# Patient Record
Sex: FEMALE | Race: OTHER | Employment: UNEMPLOYED | URBAN - METROPOLITAN AREA
[De-identification: names, ages, dates, MRNs, and addresses within clinical notes are randomized per-mention and may not be internally consistent; named-entity substitution may affect disease eponyms.]

---

## 2017-03-04 ENCOUNTER — OFFICE VISIT (OUTPATIENT)
Dept: INTERNAL MEDICINE CLINIC | Facility: CLINIC | Age: 27
End: 2017-03-04

## 2017-03-04 VITALS
WEIGHT: 264.19 LBS | SYSTOLIC BLOOD PRESSURE: 111 MMHG | BODY MASS INDEX: 41 KG/M2 | HEART RATE: 84 BPM | DIASTOLIC BLOOD PRESSURE: 76 MMHG | TEMPERATURE: 98 F | RESPIRATION RATE: 16 BRPM

## 2017-03-04 DIAGNOSIS — M54.50 ACUTE BILATERAL LOW BACK PAIN WITHOUT SCIATICA: ICD-10-CM
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
## 2017-03-04 DIAGNOSIS — M54.2 NECK PAIN, MUSCULOSKELETAL: ICD-10-CM

## 2017-03-04 DIAGNOSIS — M25.511 ACUTE PAIN OF RIGHT SHOULDER: ICD-10-CM

## 2017-03-04 PROCEDURE — 99213 OFFICE O/P EST LOW 20 MIN: CPT | Performed by: INTERNAL MEDICINE

## 2017-03-04 PROCEDURE — 99212 OFFICE O/P EST SF 10 MIN: CPT | Performed by: INTERNAL MEDICINE

## 2017-03-04 RX ORDER — CYCLOBENZAPRINE HCL 5 MG
5 TABLET ORAL 2 TIMES DAILY PRN
Qty: 30 TABLET | Refills: 0 | Status: SHIPPED | OUTPATIENT
Start: 2017-03-04
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
## 2020-12-01 ENCOUNTER — LAB REQUISITION (OUTPATIENT)
Dept: LAB | Facility: HOSPITAL | Age: 30
End: 2020-12-01
Payer: COMMERCIAL

## 2020-12-01 DIAGNOSIS — Z01.419 ENCOUNTER FOR GYNECOLOGICAL EXAMINATION (GENERAL) (ROUTINE) WITHOUT ABNORMAL FINDINGS: ICD-10-CM

## 2020-12-01 PROCEDURE — 88175 CYTOPATH C/V AUTO FLUID REDO: CPT | Performed by: OBSTETRICS & GYNECOLOGY

## 2021-07-10 PROCEDURE — 93010 ELECTROCARDIOGRAM REPORT: CPT | Performed by: EMERGENCY MEDICINE

## 2021-07-10 PROCEDURE — 93005 ELECTROCARDIOGRAM TRACING: CPT

## 2021-07-10 PROCEDURE — 99283 EMERGENCY DEPT VISIT LOW MDM: CPT

## 2021-07-11 ENCOUNTER — HOSPITAL ENCOUNTER (EMERGENCY)
Facility: HOSPITAL | Age: 31
Discharge: HOME | End: 2021-07-11
Attending: EMERGENCY MEDICINE
Payer: COMMERCIAL

## 2021-07-11 VITALS
RESPIRATION RATE: 18 BRPM | WEIGHT: 270 LBS | DIASTOLIC BLOOD PRESSURE: 91 MMHG | SYSTOLIC BLOOD PRESSURE: 134 MMHG | TEMPERATURE: 99 F | HEART RATE: 84 BPM | HEIGHT: 67 IN | BODY MASS INDEX: 42.38 KG/M2 | OXYGEN SATURATION: 97 %

## 2021-07-11 DIAGNOSIS — N64.4 BREAST PAIN: Primary | ICD-10-CM

## 2021-07-11 NOTE — ED PROVIDER NOTES
Patient Seen in: [facility] AND [facility] Emergency Department      History   Patient presents with:  Chest Pain Angina  Breast Problem    Stated Complaint: chest pain for a month    HPI/Subjective:   HPI    Patient is a 60-year-old female who presents with lef Physical Exam    GENERAL: No acute distress, awake and alert  HEENT: MMM, EOMI, PERRL  Neck: supple, non tender  CV: RRR, no murmurs, no chest wall tenderness  Resp: CTAB, no wheezes or retractions  Ab: soft, nontender, no distension  Extremities:

## 2021-07-11 NOTE — ED INITIAL ASSESSMENT (HPI)
Patient presents to ED with left sided chest pain x 1 month. Patient states that she has been to 2 doctors that have told her to take ibuprofen. Patient states that her left breast feels heavy and has had that sensation since yesterday.  Patient also state

## 2023-07-27 LAB
AMB EXT HPV: NEGATIVE
AMB EXT THINPREP PAP: NEGATIVE

## 2024-01-04 LAB — AMB EXT GONOCOCCUS, NUCLEIC ACID AMP: NOT DETECTED

## 2024-01-17 ENCOUNTER — TELEPHONE (OUTPATIENT)
Dept: OBGYN CLINIC | Facility: CLINIC | Age: 34
End: 2024-01-17

## 2024-01-17 NOTE — TELEPHONE ENCOUNTER
New Pt called to schedule misses menses appt. LMP 10/29. Had ultrasound completed at [facility] but insurance changed.

## 2024-01-18 NOTE — TELEPHONE ENCOUNTER
Pt currently 12w3d based on US from 12/14/23 noting LIUP w/+FHT's. US notes EDC of 7/29/2024. (See Care Everywhere) Pt looking to DAVID d/t insurance issues. Pt informed of both male and female providers and that we require she rotate appts with both since it will be provider on-call that might deliver her. Pt states understanding and accepts rotation.     Pt scheduled for OBN 1/30. Pt informed it is a PC appt. Pt informed to continue  care with current provider until seen.     Records in care everywhere.

## 2024-01-19 LAB
AMB EXT ANTIBODY SCREEN: NEGATIVE
AMB EXT HBSAG SCREEN: NEGATIVE
AMB EXT HEMATOCRIT: 40.4
AMB EXT HEMOGLOBIN: 13.6
AMB EXT MCV: 86
AMB EXT PLATELETS: 302
AMB EXT RH FACTOR: POSITIVE
AMB EXT RUBELLA ANTIBODIES, IGG: 3.11

## 2024-01-30 ENCOUNTER — NURSE ONLY (OUTPATIENT)
Dept: OBGYN CLINIC | Facility: CLINIC | Age: 34
End: 2024-01-30
Payer: COMMERCIAL

## 2024-01-30 DIAGNOSIS — Z34.01 ENCOUNTER FOR SUPERVISION OF NORMAL FIRST PREGNANCY IN FIRST TRIMESTER: Primary | ICD-10-CM

## 2024-01-30 RX ORDER — CHOLECALCIFEROL (VITAMIN D3) 25 MCG
1 TABLET,CHEWABLE ORAL DAILY
COMMUNITY
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
## 2024-02-03 LAB — GLUCOSE, GESTATIONAL SCREEN (50G)-130 CUTOFF: 79 MG/DL

## 2024-02-04 PROBLEM — O23.40 GBS (GROUP B STREPTOCOCCUS) UTI COMPLICATING PREGNANCY: Status: ACTIVE | Noted: 2024-02-04

## 2024-02-04 PROBLEM — O23.40 GBS (GROUP B STREPTOCOCCUS) UTI COMPLICATING PREGNANCY (HCC): Status: ACTIVE | Noted: 2024-02-04

## 2024-02-04 PROBLEM — B95.1 GBS (GROUP B STREPTOCOCCUS) UTI COMPLICATING PREGNANCY (HCC): Status: ACTIVE | Noted: 2024-02-04

## 2024-02-04 PROBLEM — B95.1 GBS (GROUP B STREPTOCOCCUS) UTI COMPLICATING PREGNANCY: Status: ACTIVE | Noted: 2024-02-04

## 2024-02-05 ENCOUNTER — TELEPHONE (OUTPATIENT)
Dept: OBGYN CLINIC | Facility: CLINIC | Age: 34
End: 2024-02-05

## 2024-02-05 DIAGNOSIS — B95.1 GBS (GROUP B STREPTOCOCCUS) UTI COMPLICATING PREGNANCY, FIRST TRIMESTER: Primary | ICD-10-CM

## 2024-02-05 DIAGNOSIS — O23.41 GBS (GROUP B STREPTOCOCCUS) UTI COMPLICATING PREGNANCY, FIRST TRIMESTER: Primary | ICD-10-CM

## 2024-02-05 RX ORDER — NITROFURANTOIN 25; 75 MG/1; MG/1
100 CAPSULE ORAL 2 TIMES DAILY
Qty: 14 CAPSULE | Refills: 0 | Status: SHIPPED | OUTPATIENT
Start: 2024-02-05 | End: 2024-02-12

## 2024-02-05 NOTE — TELEPHONE ENCOUNTER
Alonso Epstein, DO  2/4/2024  5:11 PM CST Back to Top      Needs macrobid 100mg BID x 7d for gbs UTI     Pt called and informed of test results and treatment. Pharmacy confirmed and rx sent.

## 2024-02-07 ENCOUNTER — INITIAL PRENATAL (OUTPATIENT)
Dept: OBGYN CLINIC | Facility: CLINIC | Age: 34
End: 2024-02-07
Payer: COMMERCIAL

## 2024-02-07 VITALS
SYSTOLIC BLOOD PRESSURE: 122 MMHG | HEART RATE: 87 BPM | BODY MASS INDEX: 33 KG/M2 | WEIGHT: 211.63 LBS | DIASTOLIC BLOOD PRESSURE: 76 MMHG

## 2024-02-07 DIAGNOSIS — Z34.80 ENCOUNTER FOR SUPERVISION OF OTHER NORMAL PREGNANCY, UNSPECIFIED TRIMESTER: Primary | ICD-10-CM

## 2024-02-07 PROBLEM — K80.70: Status: ACTIVE | Noted: 2024-02-07

## 2024-02-07 PROBLEM — O26.619: Status: ACTIVE | Noted: 2024-02-07

## 2024-02-07 PROBLEM — Z13.79 GENETIC SCREENING: Status: ACTIVE | Noted: 2024-02-07

## 2024-02-07 LAB
BILIRUBIN: NEGATIVE
GLUCOSE (URINE DIPSTICK): NEGATIVE MG/DL
KETONES (URINE DIPSTICK): NEGATIVE MG/DL
LEUKOCYTES: NEGATIVE
MULTISTIX LOT#: ABNORMAL NUMERIC
NITRITE, URINE: NEGATIVE
PH, URINE: 7.5 (ref 4.5–8)
PROTEIN (URINE DIPSTICK): NEGATIVE MG/DL
SPECIFIC GRAVITY: 1.02 (ref 1–1.03)
UROBILINOGEN,SEMI-QN: 0.2 MG/DL (ref 0–1.9)

## 2024-02-07 PROCEDURE — 81002 URINALYSIS NONAUTO W/O SCOPE: CPT | Performed by: OBSTETRICS & GYNECOLOGY

## 2024-02-08 LAB
C TRACH DNA SPEC QL NAA+PROBE: NEGATIVE
N GONORRHOEA DNA SPEC QL NAA+PROBE: NEGATIVE

## 2024-02-08 NOTE — PROGRESS NOTES
Chayo Lao at visit. Cosme's persist and nausea improved. Negative pap at [facility] 07-27-23. Discussed GB disease and precautions given.

## 2024-02-09 LAB — T VAGINALIS RRNA SPEC QL NAA+PROBE: NEGATIVE

## 2024-02-21 ENCOUNTER — TELEPHONE (OUTPATIENT)
Dept: OBGYN CLINIC | Facility: CLINIC | Age: 34
End: 2024-02-21

## 2024-02-22 ENCOUNTER — TELEPHONE (OUTPATIENT)
Dept: OBGYN CLINIC | Facility: CLINIC | Age: 34
End: 2024-02-22

## 2024-02-22 ENCOUNTER — OFFICE VISIT (OUTPATIENT)
Dept: OBGYN CLINIC | Facility: CLINIC | Age: 34
End: 2024-02-22

## 2024-02-22 VITALS
DIASTOLIC BLOOD PRESSURE: 77 MMHG | SYSTOLIC BLOOD PRESSURE: 127 MMHG | BODY MASS INDEX: 34 KG/M2 | WEIGHT: 218.19 LBS | HEART RATE: 91 BPM

## 2024-02-22 DIAGNOSIS — O46.92 VAGINAL BLEEDING IN PREGNANCY, SECOND TRIMESTER (HCC): Primary | ICD-10-CM

## 2024-02-22 DIAGNOSIS — R31.0 GROSS HEMATURIA: ICD-10-CM

## 2024-02-22 DIAGNOSIS — Z34.01 ENCOUNTER FOR SUPERVISION OF NORMAL FIRST PREGNANCY IN FIRST TRIMESTER (HCC): ICD-10-CM

## 2024-02-22 LAB
APPEARANCE: CLEAR
BILIRUBIN: NEGATIVE
GLUCOSE (URINE DIPSTICK): NEGATIVE MG/DL
KETONES (URINE DIPSTICK): NEGATIVE MG/DL
LEUKOCYTES: NEGATIVE
MULTISTIX LOT#: ABNORMAL NUMERIC
NITRITE, URINE: NEGATIVE
PH, URINE: 7 (ref 4.5–8)
PROTEIN (URINE DIPSTICK): NEGATIVE MG/DL
SPECIFIC GRAVITY: 1 (ref 1–1.03)
URINE-COLOR: YELLOW
UROBILINOGEN,SEMI-QN: 0.2 MG/DL (ref 0–1.9)

## 2024-02-22 PROCEDURE — 81002 URINALYSIS NONAUTO W/O SCOPE: CPT | Performed by: OBSTETRICS & GYNECOLOGY

## 2024-02-22 PROCEDURE — 99213 OFFICE O/P EST LOW 20 MIN: CPT | Performed by: OBSTETRICS & GYNECOLOGY

## 2024-02-22 NOTE — TELEPHONE ENCOUNTER
Pt is 17w1d, calling with her mother on the line to report having another episode of bloody red/brown discharge with wiping around 2pm. Pt states this happened once last week and spoke with [facility] (on-call).     Pt states she doesn't know if maybe she lifted something heavy. Pt states she was carrying groceries, but it was likely less than 25lb and stated her spouse was helping her.    Pt states she has experienced 4/10 dull pain in the center of her pelvis that comes and goes with movement, lasting only 1 second    Pt denies any recent IC, or change in her diet, exercise or stressors.  Denies any VB   Denies passing any clots    Pt states she drinks water from her 40oz uche cup, along with teas and coffee    Pt advised to push hydration with 64 oz of water daily, since the uterus is a muscle it can contact if she is not properly hydrated. Pt verbalized understanding. Pt informed message will be routed to [facility] (on-call) to review for any further recs. Pt appreciative.     Message to [facility] (on-call) to please review and advise of any further recs. Thank you.

## 2024-02-22 NOTE — TELEPHONE ENCOUNTER
#1 why did pt wait until now to call? She needs to be seen in office but now office closed. May go to ER for evaluation or can wait to be seen tomorrow in office.

## 2024-02-22 NOTE — TELEPHONE ENCOUNTER
Patient have a question regarding the 20 week anomaly scan and the prenatal appointment.  Request a nurse to call to give guidance

## 2024-02-22 NOTE — TELEPHONE ENCOUNTER
17w2d. Pt saw DIAMOND for bleeding/spotting concerns today and states he discussed upcoming anatomy scan with her. Pt is asking about timing for anatomy scan. Advised pt that anatomy scan is completed around 20 weeks. Advised pt that this can be scheduled through [facility]. Pt verbalized understanding.

## 2024-02-22 NOTE — TELEPHONE ENCOUNTER
Pt notified of [facility] recs. Pt accepts appt tomorrow 2/22 with DIAMOND at 11:20am at [place] location.

## 2024-02-23 LAB
APPEARANCE: CLEAR
BILIRUBIN: NEGATIVE
COLOR: YELLOW
GLUCOSE: NEGATIVE
KETONES: NEGATIVE
LEUKOCYTE ESTERASE: NEGATIVE
NITRITE: NEGATIVE
OCCULT BLOOD: NEGATIVE
PH: 7.5 (ref 5–8)
PROTEIN: NEGATIVE
SPECIFIC GRAVITY: 1.01 (ref 1–1.03)

## 2024-02-23 NOTE — PROGRESS NOTES
Subjective:   Patient ID: Gely ADDISON is a 33 year old female.    HPI   at 17w2d here today after seeing blood when wiping about 5-6 days ago but this followed IC. It happened again within past day and no antecedent IC. No pain, bowel / bladder complaints or LOF. Only seen when wiping after urination.     History/Other:   Review of Systems   Constitutional:  Negative for appetite change, fatigue and unexpected weight change.   Eyes:  Negative for visual disturbance.   Respiratory:  Negative for cough and shortness of breath.    Cardiovascular:  Negative for chest pain, palpitations and leg swelling.   Gastrointestinal:  Negative for abdominal distention, abdominal pain, blood in stool, constipation and diarrhea.   Genitourinary:  Positive for vaginal bleeding. Negative for dyspareunia, dysuria, frequency, pelvic pain and urgency.   Musculoskeletal:  Negative for arthralgias and myalgias.   Skin:  Negative for rash.   Neurological:  Negative for weakness, numbness and headaches.   Psychiatric/Behavioral:  Negative for dysphoric mood. The patient is not nervous/anxious.      Current Outpatient Medications   Medication Sig Dispense Refill    prenatal vitamin with DHA 27-0.8-228 MG Oral Cap Take 1 capsule by mouth daily.      Cyclobenzaprine HCl 5 MG Oral Tab Take 1 tablet (5 mg total) by mouth 2 (two) times daily as needed for Muscle spasms. (Patient not taking: Reported on 2024) 30 tablet 0    Progesterone Micronized (PROMETRIUM) 200 MG Oral Cap Take 200 mg by mouth nightly. Indications: take 2 capsules by mouth every night at bedtime for 7 days day 1-7 of month after negative pregnancy test (Patient not taking: Reported on 2024)      Escitalopram Oxalate (LEXAPRO) 20 MG Oral Tab Take 20 mg by mouth daily. (Patient not taking: Reported on 2024)      MedroxyPROGESTERone Acetate (PROVERA) 10 MG Oral Tab Take 1 tablet by mouth daily. (Patient not taking: Reported on 2024) 5 tablet 0      Allergies:  Allergies   Allergen Reactions    Shellfish-Derived Products OTHER (SEE COMMENTS)       Objective:   Physical Exam  Constitutional:       General: She is not in acute distress.     Appearance: Normal appearance. She is not ill-appearing.   Abdominal:      Comments: Fundus palpable at 18 weeks size and non-tender. FHT's at 142.   Genitourinary:     Comments: EG, vagina and cervix w/o lesions. No blood seen.  No tenderness.      Neurological:      Mental Status: She is alert.         Assessment & Plan:   1. Vaginal bleeding in pregnancy, second trimester ([facility])    2. Gross hematuria    3. Encounter for supervision of normal first pregnancy in first trimester ([facility])        Orders Placed This Encounter   Procedures    POC Urinalysis, Manual Dip without microscopy [[id number]]    Urinalysis with Culture Reflex     PLAN: As long as no recurrence after this, we'll plan level 1. If recurrent bleeding, then MFM consult / level 2. Check MSCC UA with culture reflex.     Meds This Visit:  Requested Prescriptions      No prescriptions requested or ordered in this encounter       Imaging & Referrals:  US PREG 2ND 3RD TRIMESTER (CPT=76805)

## 2024-02-26 ENCOUNTER — APPOINTMENT (OUTPATIENT)
Dept: ULTRASOUND IMAGING | Facility: HOSPITAL | Age: 34
End: 2024-02-26
Payer: COMMERCIAL

## 2024-02-26 ENCOUNTER — TELEPHONE (OUTPATIENT)
Dept: OBGYN CLINIC | Facility: CLINIC | Age: 34
End: 2024-02-26

## 2024-02-26 ENCOUNTER — HOSPITAL ENCOUNTER (EMERGENCY)
Facility: HOSPITAL | Age: 34
Discharge: HOME | End: 2024-02-26
Attending: EMERGENCY MEDICINE
Payer: COMMERCIAL

## 2024-02-26 VITALS
SYSTOLIC BLOOD PRESSURE: 116 MMHG | DIASTOLIC BLOOD PRESSURE: 64 MMHG | HEIGHT: 68 IN | TEMPERATURE: 98 F | OXYGEN SATURATION: 98 % | HEART RATE: 74 BPM | WEIGHT: 218 LBS | RESPIRATION RATE: 20 BRPM | BODY MASS INDEX: 33.04 KG/M2

## 2024-02-26 DIAGNOSIS — O20.9 VAGINAL BLEEDING BEFORE 22 WEEKS GESTATION (HCC): Primary | ICD-10-CM

## 2024-02-26 DIAGNOSIS — O46.92 VAGINAL BLEEDING IN PREGNANCY, SECOND TRIMESTER (HCC): Primary | ICD-10-CM

## 2024-02-26 LAB
B-HCG SERPL-ACNC: ABNORMAL MIU/ML
BASOPHILS # BLD AUTO: 0.03 X10(3) UL (ref 0–0.2)
BASOPHILS NFR BLD AUTO: 0.2 %
BILIRUB UR QL: NEGATIVE
CLARITY UR: CLEAR
DEPRECATED RDW RBC AUTO: 37.1 FL (ref 35.1–46.3)
EOSINOPHIL # BLD AUTO: 0.04 X10(3) UL (ref 0–0.7)
EOSINOPHIL NFR BLD AUTO: 0.3 %
ERYTHROCYTE [DISTWIDTH] IN BLOOD BY AUTOMATED COUNT: 12.4 % (ref 11–15)
GLUCOSE UR-MCNC: NORMAL MG/DL
HCT VFR BLD AUTO: 34 %
HGB BLD-MCNC: 12.2 G/DL
IMM GRANULOCYTES # BLD AUTO: 0.04 X10(3) UL (ref 0–1)
IMM GRANULOCYTES NFR BLD: 0.3 %
KETONES UR-MCNC: 40 MG/DL
LEUKOCYTE ESTERASE UR QL STRIP.AUTO: NEGATIVE
LYMPHOCYTES # BLD AUTO: 2.21 X10(3) UL (ref 1–4)
LYMPHOCYTES NFR BLD AUTO: 16.3 %
MCH RBC QN AUTO: 29.5 PG (ref 26–34)
MCHC RBC AUTO-ENTMCNC: 35.9 G/DL (ref 31–37)
MCV RBC AUTO: 82.3 FL
MONOCYTES # BLD AUTO: 0.52 X10(3) UL (ref 0.1–1)
MONOCYTES NFR BLD AUTO: 3.8 %
NEUTROPHILS # BLD AUTO: 10.7 X10 (3) UL (ref 1.5–7.7)
NEUTROPHILS # BLD AUTO: 10.7 X10(3) UL (ref 1.5–7.7)
NEUTROPHILS NFR BLD AUTO: 79.1 %
NITRITE UR QL STRIP.AUTO: NEGATIVE
PH UR: 5.5 [PH] (ref 5–8)
PLATELET # BLD AUTO: 288 10(3)UL (ref 150–450)
PROT UR-MCNC: NEGATIVE MG/DL
RBC # BLD AUTO: 4.13 X10(6)UL
RBC #/AREA URNS AUTO: >10 /HPF
SP GR UR STRIP: 1.02 (ref 1–1.03)
UROBILINOGEN UR STRIP-ACNC: NORMAL
WBC # BLD AUTO: 13.5 X10(3) UL (ref 4–11)

## 2024-02-26 PROCEDURE — 84702 CHORIONIC GONADOTROPIN TEST: CPT | Performed by: EMERGENCY MEDICINE

## 2024-02-26 PROCEDURE — 36415 COLL VENOUS BLD VENIPUNCTURE: CPT

## 2024-02-26 PROCEDURE — 81001 URINALYSIS AUTO W/SCOPE: CPT | Performed by: NURSE PRACTITIONER

## 2024-02-26 PROCEDURE — 99284 EMERGENCY DEPT VISIT MOD MDM: CPT

## 2024-02-26 PROCEDURE — 85025 COMPLETE CBC W/AUTO DIFF WBC: CPT | Performed by: EMERGENCY MEDICINE

## 2024-02-26 PROCEDURE — 76815 OB US LIMITED FETUS(S): CPT

## 2024-02-26 NOTE — ED INITIAL ASSESSMENT (HPI)
Pt presents , pt reports intermittent vaginal spotting since . Pt presents today for bright red blood with clots, stating \"it's like a period\". Pt denies abd cramping

## 2024-02-26 NOTE — ED PROVIDER NOTES
Patient Seen in: [facility] Emergency Department    History     Chief Complaint   Patient presents with    Pregnancy Issues     Stated Complaint: 18 weeks pregnant, vaginal bleeding    HPI    Patient is 18 weeks pregnant had bleeding last week resolved seen ob had pelvic exam all ok.  Last night slight brown discharge today episode of bleedign like a period.   Past Medical History:   Diagnosis Date    Essential hypertension     Hyperglycemia        Past Surgical History:   Procedure Laterality Date    ANKLE FRACTURE SURGERY Right 1999    Surgical repair of Broken ankle-Right (as per NG)            Family History   Problem Relation Age of Onset    Breast Cancer Mother         In 2021    Glaucoma Mother     Diabetes Maternal Grandmother     High Blood Pressure Maternal Grandmother     Lipids Maternal Grandmother     Heart Attack Maternal Grandmother 50        (as per NG)    Breast Cancer Maternal Grandmother     Macular degeneration Paternal Grandmother     Allergies Brother         (as per NG)    Cancer Other         MGM sister       Social History     Socioeconomic History    Marital status:    Tobacco Use    Smoking status: Never    Smokeless tobacco: Never   Substance and Sexual Activity    Alcohol use: Yes     Comment: Socially, 1 beer, last month (as per NG)    Drug use: No   Other Topics Concern    Caffeine Concern Yes     Comment: Coffee, Unknown amount (as per NG)     Social Determinants of Health     Financial Resource Strain: Low Risk  (2/6/2024)    Financial Resource Strain     Difficulty of Paying Living Expenses: Somewhat hard     Med Affordability: No   Recent Concern: Financial Resource Strain - High Risk (1/30/2024)    Financial Resource Strain     Difficulty of Paying Living Expenses: Somewhat hard     Med Affordability: Yes   Food Insecurity: Unknown (2/6/2024)    Food Insecurity     Food Insecurity: Patient declined   Transportation Needs: Unknown (2/6/2024)    Transportation Needs      Lack of Transportation: Patient declined   Stress: No Stress Concern Present (2/6/2024)    Stress     Feeling of Stress : No   Housing Stability: Low Risk  (2/6/2024)    Housing Stability     Housing Instability: No       Review of Systems    Positive for stated complaint: 18 weeks pregnant, vaginal bleeding  Other systems are as noted in HPI.  Constitutional and vital signs reviewed.      All other systems reviewed and negative except as noted above.    PSFH elements reviewed from today and agreed except as otherwise stated in HPI.    Physical Exam     ED Triage Vitals [02/26/24 1727]   /80   Pulse 106   Resp 20   Temp 98.3 °F (36.8 °C)   Temp src Oral   SpO2 98 %   O2 Device None (Room air)       Current:/64   Pulse 74   Temp 98.3 °F (36.8 °C) (Oral)   Resp 20   Ht 172.7 cm (5' 8\")   Wt 98.9 kg   LMP 10/24/2023 (Exact Date)   SpO2 98%   BMI 33.15 kg/m²     PULSE OX nl  GENERAL: anxious no distress  HEAD: normocephalic, atraumatic  EYES: PERRLA, EOMI,  THROAT: mm dry, no lesions  NECK: supple, no meningeal signs  LUNGS: no resp distress, cta bilateral  CARDIO: RRR without murmur  GI: soft non tender, no guarding nl bs    EXTREMITIES: moves all 4 spont, no edema  NEURO: alert, oriented x 3, 2-12 intact, no focal deficits appreciated  SKIN: good skin turgor, no  rashes  PSYCH: calm, cooperative,        ED Course     Labs Reviewed   URINALYSIS WITH CULTURE REFLEX - Abnormal; Notable for the following components:       Result Value    Ketones Urine 40 (*)     Blood Urine 1+ (*)     RBC Urine >10 (*)     All other components within normal limits   HCG, BETA SUBUNIT (QUANT PREGNANCY TEST) - Abnormal; Notable for the following components:    Hcg Quantitative 16,178.4 (*)     All other components within normal limits   CBC W/ DIFFERENTIAL - Abnormal; Notable for the following components:    WBC 13.5 (*)     HCT 34.0 (*)     Neutrophil Absolute Prelim 10.70 (*)     Neutrophil Absolute 10.70 (*)      All other components within normal limits   CBC WITH DIFFERENTIAL WITH PLATELET    Narrative:     The following orders were created for panel order CBC With Differential With Platelet.  Procedure                               Abnormality         Status                     ---------                               -----------         ------                     CBC W/ DIFFERENTIAL[[id number]]          Abnormal            Final result                 Please view results for these tests on the individual orders.   RAINBOW DRAW LAVENDER   RAINBOW DRAW LIGHT GREEN   RAINBOW DRAW BLUE       MDM     Monitor Interpretation:  Nsr 88    Radiology Interpretation:  US PREGNANCY LTD (CPT=76815)    Result Date: 2/26/2024  CONCLUSION: Limited obstetric ultrasound demonstrating single viable intrauterine gestation in cephalic position.  Grossly normal ODIN.  Anterior placenta without previa.  Cervix is closed with cervical length of approximately 4.6-4.8 cm.    Dictated by (CST): Brant Pulido MD on 2/26/2024 at 6:28 PM     Finalized by (CST): Brant Pulido MD on 2/26/2024 at 6:33 PM           Medical Decision Making  Problems Addressed:  Vaginal bleeding before 22 weeks gestation (HCC): acute illness or injury     Details: Spoke with Dr. Sweeney, recommend rest no straining he will have his office have her referred to [facility] for further evaluation    Amount and/or Complexity of Data Reviewed  Labs: ordered. Decision-making details documented in ED Course.  Radiology: ordered. Decision-making details documented in ED Course.          Disposition and Plan     Clinical Impression:  1. Vaginal bleeding before 22 weeks gestation (HCC)        Disposition:  Discharge    Follow-up:  Skip Kelly, DO  [address] [address] [phone]    Follow up        Medications Prescribed:  Discharge Medication List as of 2/26/2024  8:02 PM

## 2024-02-27 ENCOUNTER — TELEPHONE (OUTPATIENT)
Dept: PERINATAL CARE | Facility: HOSPITAL | Age: 34
End: 2024-02-27

## 2024-02-27 ENCOUNTER — TELEPHONE (OUTPATIENT)
Dept: OBGYN CLINIC | Facility: CLINIC | Age: 34
End: 2024-02-27

## 2024-02-27 NOTE — TELEPHONE ENCOUNTER
Paged on call from Dr Darby at ED. Patient there with recurrent red bleeding unrelated to activity. At last visit, we had previously discussed MFM consult and level 2 if recurrent. Please initiate referral and cancel level 1 US already scheduled March 12th. Diagnosis code on problem list. Thanks.

## 2024-02-27 NOTE — TELEPHONE ENCOUNTER
Pt went to ER last night for spotting/clot. ER said pt is high risk. Pt  just wanted to let DIAMOND know. Pt called [facility] they said they don't take insurance [employer]

## 2024-02-27 NOTE — TELEPHONE ENCOUNTER
Spoke to pt aware is to contact [facility] for level 2 provided her with their number. Advised pt is bed to call ASAP. Pt verbalized understandings     Will cancel level 1

## 2024-02-27 NOTE — ED QUICK NOTES
Pt verbalizes understanding of discharge paperwork including follow-up care and education. Pt a/o x4, NAD, VSS, accompanied by . Pt ambulatory with steady gait.

## 2024-02-27 NOTE — TELEPHONE ENCOUNTER
18w0d.  Pt and her mom are both on the phone.  Pt states she was in the ER last night for spotting.  This was the third time she has had spotting during this pregnancy.  The ER doctor discussed pt with DIAMOND and DIAMOND's recs were for pt to see MFM.  Pt states she was not told to make an appt with our office.  Pt does have her next ob scheduled on 3/4/2024.  Pt called [facility] and they do not take either insurances that pt has. Pt and mom advised to call both insurance companies and see what MFM practices are covered.  Pt to let us know who is covered.      Message to referral staff.  Once we know where pt can go for MFM referral should be placed.

## 2024-02-27 NOTE — TELEPHONE ENCOUNTER
Returned pt call RE scheduling.  No answer  Left Voice mail to call back with [facility] number  [phone]

## 2024-02-27 NOTE — TELEPHONE ENCOUNTER
Referral faxed to [facility] included entire prenatal episode and last appt visit with DIAMOND    Will await fax confirmation prior to letting pt know to call them to schedule an appt

## 2024-03-04 ENCOUNTER — ROUTINE PRENATAL (OUTPATIENT)
Dept: OBGYN CLINIC | Facility: CLINIC | Age: 34
End: 2024-03-04
Payer: COMMERCIAL

## 2024-03-04 VITALS
BODY MASS INDEX: 33 KG/M2 | WEIGHT: 219.81 LBS | SYSTOLIC BLOOD PRESSURE: 104 MMHG | DIASTOLIC BLOOD PRESSURE: 68 MMHG | HEART RATE: 73 BPM

## 2024-03-04 DIAGNOSIS — Z34.92 ENCOUNTER FOR SUPERVISION OF NORMAL PREGNANCY IN SECOND TRIMESTER, UNSPECIFIED GRAVIDITY (HCC): Primary | ICD-10-CM

## 2024-03-04 LAB
BILIRUBIN: NEGATIVE
GLUCOSE (URINE DIPSTICK): NEGATIVE MG/DL
KETONES (URINE DIPSTICK): NEGATIVE MG/DL
LEUKOCYTES: NEGATIVE
MULTISTIX LOT#: NORMAL NUMERIC
NITRITE, URINE: NEGATIVE
OCCULT BLOOD: NEGATIVE
PH, URINE: 8 (ref 4.5–8)
PROTEIN (URINE DIPSTICK): NEGATIVE MG/DL
SPECIFIC GRAVITY: 1 (ref 1–1.03)
UROBILINOGEN,SEMI-QN: 0.2 MG/DL (ref 0–1.9)

## 2024-03-04 PROCEDURE — 81002 URINALYSIS NONAUTO W/O SCOPE: CPT | Performed by: OBSTETRICS & GYNECOLOGY

## 2024-03-04 RX ORDER — DICLOFENAC SODIUM 75 MG/1
TABLET, DELAYED RELEASE ORAL
COMMUNITY

## 2024-03-04 NOTE — PROGRESS NOTES
Feeling well.  No VB since last Monday.  Pelvic rest advised.  Has MFM scheduled.  Reviewed US from ER with normal placental location and CL.  RTC 4 wks.

## 2024-03-12 ENCOUNTER — TELEPHONE (OUTPATIENT)
Dept: OBGYN CLINIC | Facility: CLINIC | Age: 34
End: 2024-03-12

## 2024-04-01 ENCOUNTER — ROUTINE PRENATAL (OUTPATIENT)
Dept: OBGYN CLINIC | Facility: CLINIC | Age: 34
End: 2024-04-01
Payer: COMMERCIAL

## 2024-04-01 VITALS
DIASTOLIC BLOOD PRESSURE: 73 MMHG | SYSTOLIC BLOOD PRESSURE: 116 MMHG | HEART RATE: 84 BPM | BODY MASS INDEX: 34 KG/M2 | WEIGHT: 226.81 LBS

## 2024-04-01 DIAGNOSIS — Z34.92 ENCOUNTER FOR SUPERVISION OF NORMAL PREGNANCY IN SECOND TRIMESTER, UNSPECIFIED GRAVIDITY (HCC): Primary | ICD-10-CM

## 2024-04-01 DIAGNOSIS — F41.9 ANXIETY: ICD-10-CM

## 2024-04-01 PROBLEM — O99.210 OBESITY AFFECTING PREGNANCY, ANTEPARTUM (HCC): Status: ACTIVE | Noted: 2024-04-01

## 2024-04-01 LAB
APPEARANCE: CLEAR
BILIRUBIN: NEGATIVE
GLUCOSE (URINE DIPSTICK): NEGATIVE MG/DL
KETONES (URINE DIPSTICK): NEGATIVE MG/DL
LEUKOCYTES: NEGATIVE
MULTISTIX LOT#: NORMAL NUMERIC
NITRITE, URINE: NEGATIVE
OCCULT BLOOD: NEGATIVE
PH, URINE: 7 (ref 4.5–8)
PROTEIN (URINE DIPSTICK): NEGATIVE MG/DL
SPECIFIC GRAVITY: 1.01 (ref 1–1.03)
URINE-COLOR: YELLOW
UROBILINOGEN,SEMI-QN: 0.2 MG/DL (ref 0–1.9)

## 2024-04-01 PROCEDURE — 81002 URINALYSIS NONAUTO W/O SCOPE: CPT | Performed by: OBSTETRICS & GYNECOLOGY

## 2024-04-01 NOTE — PROGRESS NOTES
Doing well. 2T labs ordered. Has not had any bleeding for the past 2 weeks. Has been having increased anxiety and would like a referral to [facility]. RTC 2 wks

## 2024-04-03 ENCOUNTER — TELEPHONE (OUTPATIENT)
Dept: OBGYN CLINIC | Facility: CLINIC | Age: 34
End: 2024-04-03

## 2024-04-03 NOTE — TELEPHONE ENCOUNTER
Patient called in to verify if a order was sent over for blood work and glucose testing to [facility].   Patient request a nurse to call to confirm.

## 2024-04-03 NOTE — TELEPHONE ENCOUNTER
Pt informed her 1 hr gtt and CBC orders were placed as [facility], so they should be able to see her lab orders in the system when she goes to [facility]. Pt verbalized understanding.

## 2024-04-05 ENCOUNTER — TELEPHONE (OUTPATIENT)
Age: 34
End: 2024-04-05

## 2024-04-11 ENCOUNTER — TELEPHONE (OUTPATIENT)
Age: 34
End: 2024-04-11

## 2024-04-11 NOTE — TELEPHONE ENCOUNTER
Leila Zhang [facility]  [facility]  [address], [address], [place], [address]  Phone: [phone]    Sarah Marsh [facility]  [facility]  [address], [place], [address]  Phone: [phone]    Tangela Mathur [facility]  [address], [place], [place], [address]  Phone: [phone]    Francia Sanchez [facility]  [facility]  [address], [place], [place], [address]  Phone: [phone]    Chelo Burnett [facility]  [facility]  [address], [place], [place], [address]  Phone: [phone]

## 2024-04-24 ENCOUNTER — TELEPHONE (OUTPATIENT)
Dept: OBGYN CLINIC | Facility: CLINIC | Age: 34
End: 2024-04-24

## 2024-04-24 DIAGNOSIS — R33.9 INCOMPLETE BLADDER EMPTYING: Primary | ICD-10-CM

## 2024-04-24 DIAGNOSIS — R35.0 FREQUENT URINATION: ICD-10-CM

## 2024-04-24 DIAGNOSIS — R30.0 BURNING WITH URINATION: ICD-10-CM

## 2024-04-24 NOTE — TELEPHONE ENCOUNTER
26w1d. Pt calling c/o UTI s/s. Pt states frequent, incomplete bladder emptying along with some burning. Pt denies body aches, chills or fever. Informed to complete UA with reflex to culture. Increase water and cranberry juice.

## 2024-04-27 ENCOUNTER — TELEPHONE (OUTPATIENT)
Dept: OBGYN CLINIC | Facility: CLINIC | Age: 34
End: 2024-04-27

## 2024-04-27 NOTE — TELEPHONE ENCOUNTER
26w4d  This RN showed results to BILL.  Per BILL on call Negative UA Pt to keep pushing fluids and Cranberry juice. Await Reflex results.  Pt aware.

## 2024-04-28 LAB
APPEARANCE: CLEAR
BILIRUBIN: NEGATIVE
COLOR: YELLOW
GLUCOSE: NEGATIVE
KETONES: NEGATIVE
NITRITE: NEGATIVE
OCCULT BLOOD: NEGATIVE
PH: 7 (ref 5–8)
PROTEIN: NEGATIVE
SPECIFIC GRAVITY: 1.01 (ref 1–1.03)

## 2024-04-30 LAB
GLUCOSE, GESTATIONAL SCREEN (50G)-130 CUTOFF: 126 MG/DL
HEMATOCRIT: 33.3 % (ref 35–45)
HEMOGLOBIN: 11 G/DL (ref 11.7–15.5)
MCH: 29.6 PG (ref 27–33)
MCHC: 33 G/DL (ref 32–36)
MCV: 89.5 FL (ref 80–100)
MPV: 10.4 FL (ref 7.5–12.5)
PLATELET COUNT: 283 THOUSAND/UL (ref 140–400)
RDW: 11.9 % (ref 11–15)
RED BLOOD CELL COUNT: 3.72 MILLION/UL (ref 3.8–5.1)
WHITE BLOOD CELL COUNT: 9.6 THOUSAND/UL (ref 3.8–10.8)

## 2024-04-30 NOTE — TELEPHONE ENCOUNTER
Nathaniel Hong. The urine test shows no infection   Written by Skip Kelly DO on 4/28/2024 10:51 PM CDT  Seen by patient Gely Erwin on 4/28/2024 10:53 PM

## 2024-05-02 ENCOUNTER — ROUTINE PRENATAL (OUTPATIENT)
Dept: OBGYN CLINIC | Facility: CLINIC | Age: 34
End: 2024-05-02

## 2024-05-02 VITALS
HEART RATE: 82 BPM | BODY MASS INDEX: 35 KG/M2 | WEIGHT: 231 LBS | DIASTOLIC BLOOD PRESSURE: 77 MMHG | SYSTOLIC BLOOD PRESSURE: 114 MMHG

## 2024-05-02 DIAGNOSIS — Z34.92 ENCOUNTER FOR SUPERVISION OF NORMAL PREGNANCY IN SECOND TRIMESTER, UNSPECIFIED GRAVIDITY (HCC): Primary | ICD-10-CM

## 2024-05-02 LAB
BILIRUBIN: NEGATIVE
GLUCOSE (URINE DIPSTICK): NEGATIVE MG/DL
KETONES (URINE DIPSTICK): NEGATIVE MG/DL
LEUKOCYTES: NEGATIVE
MULTISTIX LOT#: 57 NUMERIC
NITRITE, URINE: NEGATIVE
OCCULT BLOOD: NEGATIVE
PH, URINE: 6 (ref 4.5–8)
SPECIFIC GRAVITY: 1.02 (ref 1–1.03)
UROBILINOGEN,SEMI-QN: 0.2 MG/DL (ref 0–1.9)

## 2024-05-02 PROCEDURE — 90471 IMMUNIZATION ADMIN: CPT | Performed by: OBSTETRICS & GYNECOLOGY

## 2024-05-02 PROCEDURE — 90715 TDAP VACCINE 7 YRS/> IM: CPT | Performed by: OBSTETRICS & GYNECOLOGY

## 2024-05-02 PROCEDURE — 81002 URINALYSIS NONAUTO W/O SCOPE: CPT | Performed by: OBSTETRICS & GYNECOLOGY

## 2024-05-02 NOTE — PROGRESS NOTES
Pt tolerated TDap vaccine well to Left deltoid. Pt had no adverse reactions, VIS sheet given to pt, and signed consent form sent to scanning.

## 2024-05-08 ENCOUNTER — PATIENT MESSAGE (OUTPATIENT)
Dept: OBGYN CLINIC | Facility: CLINIC | Age: 34
End: 2024-05-08

## 2024-05-08 DIAGNOSIS — R39.89 SENSATION OF PRESSURE IN BLADDER AREA: Primary | ICD-10-CM

## 2024-05-08 DIAGNOSIS — R33.9 INCOMPLETE BLADDER EMPTYING: ICD-10-CM

## 2024-05-08 NOTE — TELEPHONE ENCOUNTER
From: Gely Erwin  To: Andreea HYATT Page  Sent: 5/8/2024 6:49 PM CDT  Subject: Question regarding Urine test on May 2nd 2024     I notice when I took my urine test in the office on May 2nd 2024 there was a trace of protein. Should I be concerned?

## 2024-05-12 LAB
APPEARANCE: CLEAR
BILIRUBIN: NEGATIVE
COLOR: YELLOW
GLUCOSE: NEGATIVE
KETONES: NEGATIVE
NITRITE: NEGATIVE
OCCULT BLOOD: NEGATIVE
PH: 7.5 (ref 5–8)
PROTEIN: NEGATIVE
SPECIFIC GRAVITY: 1.01 (ref 1–1.03)

## 2024-05-15 ENCOUNTER — TELEPHONE (OUTPATIENT)
Dept: OBGYN CLINIC | Facility: CLINIC | Age: 34
End: 2024-05-15

## 2024-05-15 NOTE — TELEPHONE ENCOUNTER
Forms found on providers desk-they are already burgess signed by Page. No other information was given to . Copies made and sent. HIPAA not signed and no fees given.

## 2024-05-16 ENCOUNTER — ROUTINE PRENATAL (OUTPATIENT)
Dept: OBGYN CLINIC | Facility: CLINIC | Age: 34
End: 2024-05-16

## 2024-05-16 VITALS
SYSTOLIC BLOOD PRESSURE: 116 MMHG | HEART RATE: 80 BPM | BODY MASS INDEX: 36 KG/M2 | DIASTOLIC BLOOD PRESSURE: 74 MMHG | WEIGHT: 238.31 LBS

## 2024-05-16 DIAGNOSIS — O99.213 OBESITY AFFECTING PREGNANCY IN THIRD TRIMESTER, UNSPECIFIED OBESITY TYPE (HCC): ICD-10-CM

## 2024-05-16 DIAGNOSIS — Z34.93 ENCOUNTER FOR SUPERVISION OF NORMAL PREGNANCY IN THIRD TRIMESTER, UNSPECIFIED GRAVIDITY (HCC): Primary | ICD-10-CM

## 2024-05-16 LAB
APPEARANCE: CLEAR
BILIRUBIN: NEGATIVE
GLUCOSE (URINE DIPSTICK): NEGATIVE MG/DL
KETONES (URINE DIPSTICK): NEGATIVE MG/DL
LEUKOCYTES: NEGATIVE
MULTISTIX LOT#: NORMAL NUMERIC
NITRITE, URINE: NEGATIVE
OCCULT BLOOD: NEGATIVE
PH, URINE: 7.5 (ref 4.5–8)
PROTEIN (URINE DIPSTICK): NEGATIVE MG/DL
SPECIFIC GRAVITY: 1.01 (ref 1–1.03)
URINE-COLOR: YELLOW
UROBILINOGEN,SEMI-QN: 0.2 MG/DL (ref 0–1.9)

## 2024-05-16 PROCEDURE — 81002 URINALYSIS NONAUTO W/O SCOPE: CPT | Performed by: OBSTETRICS & GYNECOLOGY

## 2024-05-22 NOTE — TELEPHONE ENCOUNTER
Patient called and had questions about BIMAL and a couple more questions on our forms processing- all questions answered for patient    Per patient will be submitting FMLA forms- provided patient with our dept email and fax

## 2024-05-22 NOTE — TELEPHONE ENCOUNTER
Received form for patient's medical leave (medical provider's statement form). No BIMAL was signed and NO payment was collected. Sent to the forms department for review and completion.

## 2024-05-22 NOTE — TELEPHONE ENCOUNTER
Disability forms received in forms dept. Logged for processing. [employer] message sent for Release of Information.

## 2024-05-22 NOTE — TELEPHONE ENCOUNTER
Dr. Kelly,     *The ACKNOWLEDGE button has been moved to the top right ribbon*    Please sign off on form if you agree to:  Disability (maternity leave), start date: on or near ALLIE 24, end date: 6 weeks vaginal/8 weeks    (place your signature on the first page only)    -From your Inbasket, Highlight the patient and click Chart   -Double click the 5/15/24 Forms Completion telephone encounter  -Scroll down to the Media section   -Click the blue Hyperlink:  Disability Dr. Kelly 24  -Click Acknowledge located in the top right ribbon/menu   -Drag the mouse into the blank space of the document and a + sign will appear. Left click to   electronically sign the document.     Thank you,    Dinorah CONNOR

## 2024-05-23 NOTE — TELEPHONE ENCOUNTER
Duplicate Family Medical Leave Act forms received in forms department. Moved to archive     Valid Release of Information received and scanned into patient's chart

## 2024-05-23 NOTE — TELEPHONE ENCOUNTER
Patient called and questioning charges for each form. I told her that each form there is a charge of $25. She said that she has other forms to be filled out and asked if she will be charged $25 for each form. I told her if its a one page form we probably wont charge for it. Patient expressed understanding,.

## 2024-05-24 NOTE — TELEPHONE ENCOUNTER
Family Medical Leave Act forms have been completed and hand signed prior by  Page - form has been scanned into patient's Epic, and I have faxed form to Gurmeet Kumar  department - fax #: [phone], and now awaiting a fax confirmation.

## 2024-05-24 NOTE — TELEPHONE ENCOUNTER
Family Medical Leave Act form has been faxed - received confirmation - and I have sent patient a [employer] message.

## 2024-05-24 NOTE — TELEPHONE ENCOUNTER
Disability forms completed & [facility] # [phone].    Fax confirmation received.    [employer] message sent to patient.

## 2024-05-29 ENCOUNTER — PATIENT MESSAGE (OUTPATIENT)
Dept: OBGYN CLINIC | Facility: CLINIC | Age: 34
End: 2024-05-29

## 2024-05-29 NOTE — TELEPHONE ENCOUNTER
From: Gely Erwin  To: Andreea HYATT Page  Sent: 5/29/2024 4:48 PM CDT  Subject: Recommendations for heart burn and acid reflux     Hello, I’m currently dealing with constant heartburn and acid reflux. Do you have any recommendations what I can do and take for it? I’m struggling drinking enough water because I have acid reflux most of the time after drinking water.

## 2024-06-03 ENCOUNTER — HOSPITAL ENCOUNTER (OUTPATIENT)
Dept: ULTRASOUND IMAGING | Age: 34
Discharge: HOME | End: 2024-06-03
Attending: OBSTETRICS & GYNECOLOGY
Payer: COMMERCIAL

## 2024-06-03 ENCOUNTER — ROUTINE PRENATAL (OUTPATIENT)
Dept: OBGYN CLINIC | Facility: CLINIC | Age: 34
End: 2024-06-03
Payer: COMMERCIAL

## 2024-06-03 VITALS
WEIGHT: 240.63 LBS | SYSTOLIC BLOOD PRESSURE: 107 MMHG | DIASTOLIC BLOOD PRESSURE: 74 MMHG | BODY MASS INDEX: 37 KG/M2 | HEART RATE: 97 BPM

## 2024-06-03 DIAGNOSIS — Z34.91 ENCOUNTER FOR SUPERVISION OF NORMAL PREGNANCY IN FIRST TRIMESTER, UNSPECIFIED GRAVIDITY (HCC): Primary | ICD-10-CM

## 2024-06-03 DIAGNOSIS — O99.213 OBESITY AFFECTING PREGNANCY IN THIRD TRIMESTER, UNSPECIFIED OBESITY TYPE (HCC): ICD-10-CM

## 2024-06-03 LAB
APPEARANCE: CLEAR
BILIRUBIN: NEGATIVE
GLUCOSE (URINE DIPSTICK): NEGATIVE MG/DL
KETONES (URINE DIPSTICK): NEGATIVE MG/DL
LEUKOCYTES: NEGATIVE
MULTISTIX LOT#: NORMAL NUMERIC
NITRITE, URINE: NEGATIVE
OCCULT BLOOD: NEGATIVE
PH, URINE: 6.5 (ref 4.5–8)
SPECIFIC GRAVITY: 1.01 (ref 1–1.03)
URINE-COLOR: YELLOW
UROBILINOGEN,SEMI-QN: 0.2 MG/DL (ref 0–1.9)

## 2024-06-03 PROCEDURE — 81002 URINALYSIS NONAUTO W/O SCOPE: CPT | Performed by: OBSTETRICS & GYNECOLOGY

## 2024-06-03 PROCEDURE — 76816 OB US FOLLOW-UP PER FETUS: CPT | Performed by: OBSTETRICS & GYNECOLOGY

## 2024-06-11 ENCOUNTER — APPOINTMENT (OUTPATIENT)
Dept: ULTRASOUND IMAGING | Facility: HOSPITAL | Age: 34
End: 2024-06-11
Attending: OBSTETRICS & GYNECOLOGY
Payer: COMMERCIAL

## 2024-06-11 ENCOUNTER — HOSPITAL ENCOUNTER (OUTPATIENT)
Facility: HOSPITAL | Age: 34
Discharge: HOME | End: 2024-06-11
Attending: OBSTETRICS & GYNECOLOGY | Admitting: OBSTETRICS & GYNECOLOGY
Payer: COMMERCIAL

## 2024-06-11 VITALS — HEART RATE: 102 BPM | SYSTOLIC BLOOD PRESSURE: 128 MMHG | DIASTOLIC BLOOD PRESSURE: 81 MMHG

## 2024-06-11 PROCEDURE — 59025 FETAL NON-STRESS TEST: CPT | Performed by: OBSTETRICS & GYNECOLOGY

## 2024-06-11 PROCEDURE — 76819 FETAL BIOPHYS PROFIL W/O NST: CPT | Performed by: OBSTETRICS & GYNECOLOGY

## 2024-06-11 PROCEDURE — 76818 FETAL BIOPHYS PROFILE W/NST: CPT | Performed by: OBSTETRICS & GYNECOLOGY

## 2024-06-11 NOTE — TRIAGE
[facility]  part of [facility]      Triage Note    Gely Erwin Patient Status:  Outpatient    1990 MRN [id number]   Location [facility] Attending Manjula Acevedo MD   Hosp Day # 0 PCP None Pcp      Para:   Estimated Date of Delivery: 24  Gestation: 33w0d    Chief Complaint    Non-stress Test; Vaginal Bleeding         Allergies:    Allergies   Allergen Reactions    Shellfish-Derived Products OTHER (SEE COMMENTS)       No orders of the defined types were placed in this encounter.      Lab Results   Component Value Date    WBC 9.6 2024    HGB 11.0 (L) 2024    HCT 33.3 (L) 2024     2024    CREATSERUM 0.93 2014    BUN 10 2014     2014    K 4.1 2014     2014    CO2 28 2014    GLU 91 2014    CA 9.0 2014    ALB 3.8 2014    BILT 0.3 2014    TP 6.4 2014    AST 15 2014    ALT 15 2014    RPR Non-reactive 2024       Clinitek UA  Lab Results   Component Value Date    GLUUR NEGATIVE 05/10/2024    SPECGRAVITY 1.010 2024       UA  Lab Results   Component Value Date    COLORUR YELLOW 05/10/2024    CLARITY CLEAR 05/10/2024    SPECGRAVITY 1.010 2024    PROUR NEGATIVE 05/10/2024    GLUUR NEGATIVE 05/10/2024    KETUR NEGATIVE 05/10/2024    BILUR NEGATIVE 05/10/2024    BLOODURINE 1+ (A) 2024    NITRITE Negative 2024    UROBILINOGEN Normal 2024    LEUUR TRACE (A) 05/10/2024       Vitals:    24 1528   BP: 128/81   Pulse: 102       NST  Variability: Moderate           Accelerations: Yes           Decelerations: Variable (x 2)            Baseline: 145 BPM           Uterine Irritability: No           Contractions: Not present                                        Acoustic Stimulator: No           Nonstress Test Interpretation: Reactive                                    Additional Comments        Chief Complaint   Patient presents with    Non-stress Test     Sent from home with orders for an NST only    Vaginal Bleeding     Scant bleeding On Sunday which stopped and then again resumed today     EFM tracing reactive with 2 variable decelerations noted throughout tracing. Tracing intermittent at times due to fetal movement. No vaginal bleeding while in triage. Dr Acevedo informed of findings and reviewed tracing. BPP ordered. Reported BPP results. Discharge order received. Pt instructed on no work until further notice. Note given for work restriction. Pt to follow up at office this week. Pt instructed on kick counts and to report vaginal bleeding. Pt states understanding of instructions. Discharged to home.    Kristel LENTZ RN  6/11/2024 6:12 PM      NST note     I have reviewed the NST and agree with the above findings and recommendations.     Diagnosis:  spotting 3T    Plan: bedrest & follow up in one week    Manjula Acevedo MD    6/11/2024    8:28 PM

## 2024-06-11 NOTE — PROGRESS NOTES
Pt is a 33 year old female admitted to TR1/TR1-A.     Chief Complaint   Patient presents with    Non-stress Test     Sent from home with orders for an NST only    Vaginal Bleeding     Scant bleeding On  which stopped and then again resumed today      Pt is  33w0d intra-uterine pregnancy.  History obtained, consents signed. Oriented to room, staff, and plan of care.

## 2024-06-12 ENCOUNTER — TELEPHONE (OUTPATIENT)
Dept: OBGYN CLINIC | Facility: CLINIC | Age: 34
End: 2024-06-12

## 2024-06-12 NOTE — TELEPHONE ENCOUNTER
Patient booked with Dr. Bowens tomorrow at 840 am. Patient needs to be seen this week for follow up to [facility]. Left detailed message for patient to call back to let us know she got the message.

## 2024-06-12 NOTE — TELEPHONE ENCOUNTER
Per patient was told to schedule prenatal visit for this week, no available openings. Please advise

## 2024-06-13 ENCOUNTER — ROUTINE PRENATAL (OUTPATIENT)
Dept: OBGYN CLINIC | Facility: CLINIC | Age: 34
End: 2024-06-13

## 2024-06-13 VITALS
SYSTOLIC BLOOD PRESSURE: 111 MMHG | DIASTOLIC BLOOD PRESSURE: 72 MMHG | HEART RATE: 79 BPM | BODY MASS INDEX: 37 KG/M2 | WEIGHT: 244.81 LBS

## 2024-06-13 DIAGNOSIS — Z34.93 ENCOUNTER FOR SUPERVISION OF NORMAL PREGNANCY IN THIRD TRIMESTER, UNSPECIFIED GRAVIDITY (HCC): Primary | ICD-10-CM

## 2024-06-13 LAB
APPEARANCE: CLEAR
BILIRUBIN: NEGATIVE
GLUCOSE (URINE DIPSTICK): NEGATIVE MG/DL
KETONES (URINE DIPSTICK): NEGATIVE MG/DL
LEUKOCYTES: NEGATIVE
MULTISTIX LOT#: NORMAL NUMERIC
NITRITE, URINE: NEGATIVE
OCCULT BLOOD: NEGATIVE
PH, URINE: 5 (ref 4.5–8)
PROTEIN (URINE DIPSTICK): NEGATIVE MG/DL
SPECIFIC GRAVITY: 1.02 (ref 1–1.03)
URINE-COLOR: YELLOW
UROBILINOGEN,SEMI-QN: 1 MG/DL (ref 0–1.9)

## 2024-06-13 PROCEDURE — 81002 URINALYSIS NONAUTO W/O SCOPE: CPT | Performed by: OBSTETRICS & GYNECOLOGY

## 2024-06-20 ENCOUNTER — LAB ENCOUNTER (OUTPATIENT)
Dept: LAB | Age: 34
End: 2024-06-20
Attending: OBSTETRICS & GYNECOLOGY

## 2024-06-20 ENCOUNTER — ROUTINE PRENATAL (OUTPATIENT)
Dept: OBGYN CLINIC | Facility: CLINIC | Age: 34
End: 2024-06-20

## 2024-06-20 VITALS
BODY MASS INDEX: 38 KG/M2 | WEIGHT: 248.81 LBS | DIASTOLIC BLOOD PRESSURE: 79 MMHG | SYSTOLIC BLOOD PRESSURE: 121 MMHG | HEART RATE: 86 BPM

## 2024-06-20 DIAGNOSIS — Z34.93 ENCOUNTER FOR SUPERVISION OF NORMAL PREGNANCY IN THIRD TRIMESTER, UNSPECIFIED GRAVIDITY (HCC): Primary | ICD-10-CM

## 2024-06-20 DIAGNOSIS — Z34.93 ENCOUNTER FOR SUPERVISION OF NORMAL PREGNANCY IN THIRD TRIMESTER, UNSPECIFIED GRAVIDITY (HCC): ICD-10-CM

## 2024-06-20 DIAGNOSIS — O12.13: ICD-10-CM

## 2024-06-20 LAB
APPEARANCE: CLEAR
BILIRUB UR QL: NEGATIVE
CLARITY UR: CLEAR
CREAT UR-SCNC: 21.9 MG/DL
GLUCOSE (URINE DIPSTICK): NEGATIVE MG/DL
GLUCOSE UR-MCNC: NORMAL MG/DL
HGB UR QL STRIP.AUTO: NEGATIVE
KETONES (URINE DIPSTICK): NEGATIVE MG/DL
KETONES UR-MCNC: NEGATIVE MG/DL
LEUKOCYTE ESTERASE UR QL STRIP.AUTO: NEGATIVE
LEUKOCYTES: NEGATIVE
MULTISTIX LOT#: ABNORMAL NUMERIC
NITRITE UR QL STRIP.AUTO: NEGATIVE
NITRITE, URINE: NEGATIVE
OCCULT BLOOD: NEGATIVE
PH UR: 6.5 [PH] (ref 5–8)
PH, URINE: 7 (ref 4.5–8)
PROT UR-MCNC: <6 MG/DL (ref ?–14)
PROT UR-MCNC: NEGATIVE MG/DL
PROTEIN (URINE DIPSTICK): >=300 MG/DL
SP GR UR STRIP: <1.005 (ref 1–1.03)
SPECIFIC GRAVITY: 1.01 (ref 1–1.03)
URINE-COLOR: YELLOW
UROBILINOGEN UR STRIP-ACNC: NORMAL
UROBILINOGEN,SEMI-QN: 0.2 MG/DL (ref 0–1.9)

## 2024-06-20 PROCEDURE — 82570 ASSAY OF URINE CREATININE: CPT

## 2024-06-20 PROCEDURE — 81002 URINALYSIS NONAUTO W/O SCOPE: CPT | Performed by: OBSTETRICS & GYNECOLOGY

## 2024-06-20 PROCEDURE — 84156 ASSAY OF PROTEIN URINE: CPT

## 2024-06-20 PROCEDURE — 81003 URINALYSIS AUTO W/O SCOPE: CPT | Performed by: OBSTETRICS & GYNECOLOGY

## 2024-06-20 NOTE — PROGRESS NOTES
No further bleeding. No S/S of PTL. Discussed proteinuria and we'll do urine studies with [facility] catch in lab.

## 2024-06-28 ENCOUNTER — ROUTINE PRENATAL (OUTPATIENT)
Dept: OBGYN CLINIC | Facility: CLINIC | Age: 34
End: 2024-06-28

## 2024-06-28 ENCOUNTER — LAB ENCOUNTER (OUTPATIENT)
Dept: LAB | Facility: HOSPITAL | Age: 34
End: 2024-06-28
Attending: OBSTETRICS & GYNECOLOGY
Payer: COMMERCIAL

## 2024-06-28 VITALS
HEART RATE: 99 BPM | BODY MASS INDEX: 38 KG/M2 | WEIGHT: 249.63 LBS | DIASTOLIC BLOOD PRESSURE: 79 MMHG | SYSTOLIC BLOOD PRESSURE: 115 MMHG

## 2024-06-28 DIAGNOSIS — Z34.91 ENCOUNTER FOR SUPERVISION OF NORMAL PREGNANCY IN FIRST TRIMESTER, UNSPECIFIED GRAVIDITY (HCC): Primary | ICD-10-CM

## 2024-06-28 LAB
APPEARANCE: CLEAR
BILIRUBIN: NEGATIVE
DEPRECATED RDW RBC AUTO: 37.9 FL (ref 35.1–46.3)
ERYTHROCYTE [DISTWIDTH] IN BLOOD BY AUTOMATED COUNT: 12.5 % (ref 11–15)
GLUCOSE (URINE DIPSTICK): NEGATIVE MG/DL
HCT VFR BLD AUTO: 33.7 %
HGB BLD-MCNC: 11.4 G/DL
KETONES (URINE DIPSTICK): NEGATIVE MG/DL
LEUKOCYTES: NEGATIVE
MCH RBC QN AUTO: 28 PG (ref 26–34)
MCHC RBC AUTO-ENTMCNC: 33.8 G/DL (ref 31–37)
MCV RBC AUTO: 82.8 FL
MULTISTIX LOT#: NORMAL NUMERIC
NITRITE, URINE: NEGATIVE
OCCULT BLOOD: NEGATIVE
PH, URINE: 7 (ref 4.5–8)
PLATELET # BLD AUTO: 302 10(3)UL (ref 150–450)
PROTEIN (URINE DIPSTICK): NEGATIVE MG/DL
RBC # BLD AUTO: 4.07 X10(6)UL
SPECIFIC GRAVITY: 1.02 (ref 1–1.03)
T PALLIDUM AB SER QL IA: NONREACTIVE
URINE-COLOR: YELLOW
UROBILINOGEN,SEMI-QN: 0.2 MG/DL (ref 0–1.9)
WBC # BLD AUTO: 9.7 X10(3) UL (ref 4–11)

## 2024-06-28 PROCEDURE — 86780 TREPONEMA PALLIDUM: CPT | Performed by: OBSTETRICS & GYNECOLOGY

## 2024-06-28 PROCEDURE — 36415 COLL VENOUS BLD VENIPUNCTURE: CPT | Performed by: OBSTETRICS & GYNECOLOGY

## 2024-06-28 PROCEDURE — 81002 URINALYSIS NONAUTO W/O SCOPE: CPT | Performed by: OBSTETRICS & GYNECOLOGY

## 2024-06-28 PROCEDURE — 85027 COMPLETE CBC AUTOMATED: CPT | Performed by: OBSTETRICS & GYNECOLOGY

## 2024-06-28 PROCEDURE — 87389 HIV-1 AG W/HIV-1&-2 AB AG IA: CPT | Performed by: OBSTETRICS & GYNECOLOGY

## 2024-07-03 ENCOUNTER — ROUTINE PRENATAL (OUTPATIENT)
Dept: OBGYN CLINIC | Facility: CLINIC | Age: 34
End: 2024-07-03
Payer: COMMERCIAL

## 2024-07-03 ENCOUNTER — TELEPHONE (OUTPATIENT)
Dept: OBGYN CLINIC | Facility: CLINIC | Age: 34
End: 2024-07-03

## 2024-07-03 VITALS
HEART RATE: 83 BPM | SYSTOLIC BLOOD PRESSURE: 108 MMHG | DIASTOLIC BLOOD PRESSURE: 74 MMHG | BODY MASS INDEX: 39 KG/M2 | WEIGHT: 253.63 LBS

## 2024-07-03 DIAGNOSIS — Z34.93 ENCOUNTER FOR SUPERVISION OF NORMAL PREGNANCY IN THIRD TRIMESTER, UNSPECIFIED GRAVIDITY (HCC): Primary | ICD-10-CM

## 2024-07-03 LAB
APPEARANCE: CLEAR
BILIRUBIN: NEGATIVE
GLUCOSE (URINE DIPSTICK): NEGATIVE MG/DL
KETONES (URINE DIPSTICK): NEGATIVE MG/DL
LEUKOCYTES: NEGATIVE
MULTISTIX LOT#: NORMAL NUMERIC
NITRITE, URINE: NEGATIVE
OCCULT BLOOD: NEGATIVE
PH, URINE: 6.5 (ref 4.5–8)
PROTEIN (URINE DIPSTICK): NEGATIVE MG/DL
SPECIFIC GRAVITY: 1 (ref 1–1.03)
URINE-COLOR: YELLOW
UROBILINOGEN,SEMI-QN: 0.2 MG/DL (ref 0–1.9)

## 2024-07-03 PROCEDURE — 81002 URINALYSIS NONAUTO W/O SCOPE: CPT | Performed by: OBSTETRICS & GYNECOLOGY

## 2024-07-03 NOTE — TELEPHONE ENCOUNTER
Call placed to patient. She confirmed she is working with a care coordinator for her pregnancy at [facility]. She states okay for us to speak with them.     Left message to call back to Flor at [facility].

## 2024-07-11 ENCOUNTER — ROUTINE PRENATAL (OUTPATIENT)
Dept: OBGYN CLINIC | Facility: CLINIC | Age: 34
End: 2024-07-11

## 2024-07-11 ENCOUNTER — TELEPHONE (OUTPATIENT)
Dept: OBGYN CLINIC | Facility: CLINIC | Age: 34
End: 2024-07-11

## 2024-07-11 VITALS
SYSTOLIC BLOOD PRESSURE: 108 MMHG | DIASTOLIC BLOOD PRESSURE: 74 MMHG | WEIGHT: 250 LBS | HEART RATE: 103 BPM | BODY MASS INDEX: 38 KG/M2

## 2024-07-11 DIAGNOSIS — Z34.80 ENCOUNTER FOR SUPERVISION OF OTHER NORMAL PREGNANCY, UNSPECIFIED TRIMESTER (HCC): Primary | ICD-10-CM

## 2024-07-11 LAB
GLUCOSE (URINE DIPSTICK): NEGATIVE MG/DL
MULTISTIX EXPIRATION DATE: 8 1 24 DATE
MULTISTIX LOT#: ABNORMAL NUMERIC
NITRITE, URINE: NEGATIVE
OCCULT BLOOD: NEGATIVE
PH, URINE: 6 (ref 4.5–8)
SPECIFIC GRAVITY: 1.01 (ref 1–1.03)
UROBILINOGEN,SEMI-QN: 0.2 MG/DL (ref 0–1.9)

## 2024-07-11 PROCEDURE — 81002 URINALYSIS NONAUTO W/O SCOPE: CPT | Performed by: OBSTETRICS & GYNECOLOGY

## 2024-07-11 NOTE — TELEPHONE ENCOUNTER
Please offer 8/6/24 at 1030am with Dr. Gomes (appointment has been placed on hold for patient). Also, please inform patient Dr. Gomes is on-call that day. Thank you!

## 2024-07-11 NOTE — TELEPHONE ENCOUNTER
PN patient need appointment for week of 08/05-08/10 per JLK  but there are no openings,please contact patient.

## 2024-07-11 NOTE — TELEPHONE ENCOUNTER
37w2d   Patient has appointments for 7/18, 7/25, 7/29  Spoke with patient RN offered her an appointment 8/10. Patient  states Dr. Bowens told her she can not schedule anything after she is 41 weeks, patient requesting an appointment 8/5 or 8/6. Patient aware will have to send message to Supervisor for assisting with scheduling.    To [facility] Nursing supervisor

## 2024-07-18 ENCOUNTER — ROUTINE PRENATAL (OUTPATIENT)
Dept: OBGYN CLINIC | Facility: CLINIC | Age: 34
End: 2024-07-18

## 2024-07-18 VITALS
DIASTOLIC BLOOD PRESSURE: 80 MMHG | BODY MASS INDEX: 38 KG/M2 | SYSTOLIC BLOOD PRESSURE: 115 MMHG | HEART RATE: 72 BPM | WEIGHT: 250 LBS

## 2024-07-18 DIAGNOSIS — Z34.93 ENCOUNTER FOR SUPERVISION OF NORMAL PREGNANCY IN THIRD TRIMESTER, UNSPECIFIED GRAVIDITY (HCC): Primary | ICD-10-CM

## 2024-07-18 LAB
BILIRUBIN: NEGATIVE
GLUCOSE (URINE DIPSTICK): NEGATIVE MG/DL
KETONES (URINE DIPSTICK): NEGATIVE MG/DL
LEUKOCYTES: NEGATIVE
MULTISTIX LOT#: 57 NUMERIC
NITRITE, URINE: NEGATIVE
OCCULT BLOOD: NEGATIVE
PH, URINE: 7 (ref 4.5–8)
PROTEIN (URINE DIPSTICK): NEGATIVE MG/DL
SPECIFIC GRAVITY: 1.01 (ref 1–1.03)
UROBILINOGEN,SEMI-QN: 0.2 MG/DL (ref 0–1.9)

## 2024-07-18 PROCEDURE — 81002 URINALYSIS NONAUTO W/O SCOPE: CPT | Performed by: OBSTETRICS & GYNECOLOGY

## 2024-07-25 ENCOUNTER — ROUTINE PRENATAL (OUTPATIENT)
Dept: OBGYN CLINIC | Facility: CLINIC | Age: 34
End: 2024-07-25
Payer: COMMERCIAL

## 2024-07-25 VITALS
HEART RATE: 80 BPM | WEIGHT: 252 LBS | DIASTOLIC BLOOD PRESSURE: 78 MMHG | SYSTOLIC BLOOD PRESSURE: 115 MMHG | BODY MASS INDEX: 38 KG/M2

## 2024-07-25 DIAGNOSIS — Z34.93 ENCOUNTER FOR SUPERVISION OF NORMAL PREGNANCY IN THIRD TRIMESTER, UNSPECIFIED GRAVIDITY (HCC): Primary | ICD-10-CM

## 2024-07-25 LAB
BILIRUBIN: NEGATIVE
GLUCOSE (URINE DIPSTICK): NEGATIVE MG/DL
KETONES (URINE DIPSTICK): NEGATIVE MG/DL
MULTISTIX LOT#: 57 NUMERIC
NITRITE, URINE: NEGATIVE
OCCULT BLOOD: NEGATIVE
PH, URINE: 6 (ref 4.5–8)
SPECIFIC GRAVITY: 1.01 (ref 1–1.03)
UROBILINOGEN,SEMI-QN: 0.2 MG/DL (ref 0–1.9)

## 2024-07-25 PROCEDURE — 81002 URINALYSIS NONAUTO W/O SCOPE: CPT | Performed by: OBSTETRICS & GYNECOLOGY

## 2024-07-29 ENCOUNTER — ROUTINE PRENATAL (OUTPATIENT)
Dept: OBGYN CLINIC | Facility: CLINIC | Age: 34
End: 2024-07-29
Payer: COMMERCIAL

## 2024-07-29 VITALS
BODY MASS INDEX: 38 KG/M2 | WEIGHT: 253 LBS | HEART RATE: 84 BPM | DIASTOLIC BLOOD PRESSURE: 79 MMHG | SYSTOLIC BLOOD PRESSURE: 117 MMHG

## 2024-07-29 DIAGNOSIS — Z34.93 ENCOUNTER FOR SUPERVISION OF NORMAL PREGNANCY IN THIRD TRIMESTER, UNSPECIFIED GRAVIDITY (HCC): Primary | ICD-10-CM

## 2024-07-29 PROCEDURE — 81002 URINALYSIS NONAUTO W/O SCOPE: CPT | Performed by: OBSTETRICS & GYNECOLOGY

## 2024-08-05 ENCOUNTER — TELEPHONE (OUTPATIENT)
Dept: OBGYN CLINIC | Facility: CLINIC | Age: 34
End: 2024-08-05

## 2024-08-06 ENCOUNTER — HOSPITAL ENCOUNTER (INPATIENT)
Facility: HOSPITAL | Age: 34
LOS: 3 days | Discharge: HOME | End: 2024-08-09
Attending: OBSTETRICS & GYNECOLOGY | Admitting: OBSTETRICS & GYNECOLOGY
Payer: COMMERCIAL

## 2024-08-06 ENCOUNTER — ROUTINE PRENATAL (OUTPATIENT)
Dept: OBGYN CLINIC | Facility: CLINIC | Age: 34
End: 2024-08-06

## 2024-08-06 VITALS
DIASTOLIC BLOOD PRESSURE: 80 MMHG | HEART RATE: 88 BPM | BODY MASS INDEX: 39 KG/M2 | WEIGHT: 259.63 LBS | SYSTOLIC BLOOD PRESSURE: 117 MMHG

## 2024-08-06 DIAGNOSIS — Z34.93 ENCOUNTER FOR SUPERVISION OF NORMAL PREGNANCY IN THIRD TRIMESTER, UNSPECIFIED GRAVIDITY (HCC): Primary | ICD-10-CM

## 2024-08-06 PROBLEM — Z34.90 PREGNANCY (HCC): Status: ACTIVE | Noted: 2024-08-06

## 2024-08-06 LAB
ANTIBODY SCREEN: NEGATIVE
APPEARANCE: CLEAR
BASOPHILS # BLD AUTO: 0.02 X10(3) UL (ref 0–0.2)
BASOPHILS NFR BLD AUTO: 0.2 %
BILIRUBIN: NEGATIVE
DEPRECATED RDW RBC AUTO: 41.5 FL (ref 35.1–46.3)
EOSINOPHIL # BLD AUTO: 0.05 X10(3) UL (ref 0–0.7)
EOSINOPHIL NFR BLD AUTO: 0.6 %
ERYTHROCYTE [DISTWIDTH] IN BLOOD BY AUTOMATED COUNT: 14.3 % (ref 11–15)
GLUCOSE (URINE DIPSTICK): NEGATIVE MG/DL
HCT VFR BLD AUTO: 36 %
HGB BLD-MCNC: 12.5 G/DL
IMM GRANULOCYTES # BLD AUTO: 0.04 X10(3) UL (ref 0–1)
IMM GRANULOCYTES NFR BLD: 0.4 %
KETONES (URINE DIPSTICK): NEGATIVE MG/DL
LYMPHOCYTES # BLD AUTO: 2.17 X10(3) UL (ref 1–4)
LYMPHOCYTES NFR BLD AUTO: 24.2 %
MCH RBC QN AUTO: 28 PG (ref 26–34)
MCHC RBC AUTO-ENTMCNC: 34.7 G/DL (ref 31–37)
MCV RBC AUTO: 80.5 FL
MONOCYTES # BLD AUTO: 0.31 X10(3) UL (ref 0.1–1)
MONOCYTES NFR BLD AUTO: 3.5 %
MULTISTIX LOT#: ABNORMAL NUMERIC
NEUTROPHILS # BLD AUTO: 6.36 X10 (3) UL (ref 1.5–7.7)
NEUTROPHILS # BLD AUTO: 6.36 X10(3) UL (ref 1.5–7.7)
NEUTROPHILS NFR BLD AUTO: 71.1 %
NITRITE, URINE: NEGATIVE
PH, URINE: 6.5 (ref 4.5–8)
PLATELET # BLD AUTO: 247 10(3)UL (ref 150–450)
PROTEIN (URINE DIPSTICK): 30 MG/DL
RBC # BLD AUTO: 4.47 X10(6)UL
RH BLOOD TYPE: POSITIVE
RH BLOOD TYPE: POSITIVE
SPECIFIC GRAVITY: 1.02 (ref 1–1.03)
T PALLIDUM AB SER QL IA: NONREACTIVE
URINE-COLOR: YELLOW
UROBILINOGEN,SEMI-QN: 0.2 MG/DL (ref 0–1.9)
WBC # BLD AUTO: 9 X10(3) UL (ref 4–11)

## 2024-08-06 PROCEDURE — 81002 URINALYSIS NONAUTO W/O SCOPE: CPT | Performed by: OBSTETRICS & GYNECOLOGY

## 2024-08-06 PROCEDURE — 76815 OB US LIMITED FETUS(S): CPT | Performed by: OBSTETRICS & GYNECOLOGY

## 2024-08-06 RX ORDER — ACETAMINOPHEN 500 MG
500 TABLET ORAL EVERY 6 HOURS PRN
Status: DISCONTINUED | OUTPATIENT
Start: 2024-08-06 | End: 2024-08-07 | Stop reason: HOSPADM

## 2024-08-06 RX ORDER — ONDANSETRON 2 MG/ML
4 INJECTION INTRAMUSCULAR; INTRAVENOUS EVERY 6 HOURS PRN
Status: DISCONTINUED | OUTPATIENT
Start: 2024-08-06 | End: 2024-08-07 | Stop reason: HOSPADM

## 2024-08-06 RX ORDER — ACETAMINOPHEN 500 MG
1000 TABLET ORAL EVERY 6 HOURS PRN
Status: DISCONTINUED | OUTPATIENT
Start: 2024-08-06 | End: 2024-08-07 | Stop reason: HOSPADM

## 2024-08-06 RX ORDER — CITRIC ACID/SODIUM CITRATE 334-500MG
30 SOLUTION, ORAL ORAL AS NEEDED
Status: DISCONTINUED | OUTPATIENT
Start: 2024-08-06 | End: 2024-08-07 | Stop reason: HOSPADM

## 2024-08-06 RX ORDER — IBUPROFEN 600 MG/1
600 TABLET ORAL ONCE AS NEEDED
Status: DISCONTINUED | OUTPATIENT
Start: 2024-08-06 | End: 2024-08-07 | Stop reason: HOSPADM

## 2024-08-06 RX ORDER — SODIUM CHLORIDE, SODIUM LACTATE, POTASSIUM CHLORIDE, CALCIUM CHLORIDE 600; 310; 30; 20 MG/100ML; MG/100ML; MG/100ML; MG/100ML
INJECTION, SOLUTION INTRAVENOUS CONTINUOUS
Status: DISCONTINUED | OUTPATIENT
Start: 2024-08-06 | End: 2024-08-07 | Stop reason: HOSPADM

## 2024-08-06 RX ORDER — LIDOCAINE HYDROCHLORIDE 10 MG/ML
30 INJECTION, SOLUTION EPIDURAL; INFILTRATION; INTRACAUDAL; PERINEURAL ONCE
Status: DISCONTINUED | OUTPATIENT
Start: 2024-08-06 | End: 2024-08-07 | Stop reason: HOSPADM

## 2024-08-06 RX ORDER — TERBUTALINE SULFATE 1 MG/ML
0.25 INJECTION, SOLUTION SUBCUTANEOUS AS NEEDED
Status: DISCONTINUED | OUTPATIENT
Start: 2024-08-06 | End: 2024-08-07 | Stop reason: HOSPADM

## 2024-08-06 RX ORDER — DEXTROSE, SODIUM CHLORIDE, SODIUM LACTATE, POTASSIUM CHLORIDE, AND CALCIUM CHLORIDE 5; .6; .31; .03; .02 G/100ML; G/100ML; G/100ML; G/100ML; G/100ML
INJECTION, SOLUTION INTRAVENOUS AS NEEDED
Status: DISCONTINUED | OUTPATIENT
Start: 2024-08-06 | End: 2024-08-07 | Stop reason: HOSPADM

## 2024-08-06 NOTE — H&P
[facility]  part of [facility]    History & Physical    Gely Erwin Patient Status:  Inpatient    1990 MRN [id number]   Location [facility] Attending Yuni Gomes MD   Hosp Day # 0 PCP None Pcp     Date of Admission:  2024    SUBJECTIVE:    Gely Erwin is a 34 year old  at 41w0d with 2024, by Last Menstrual Period who is being admitted for induction of labor for postdates with non-reactive NST. Patient reports no complaints .   Fetal Movement: normal.     Problem List:   Patient Active Problem List    Diagnosis    Pregnancy ([facility])    Proteinuria complicating pregnancy, third trimester ([facility])     24  Check [facility] UA and P/C ratio today in lab      Obesity affecting pregnancy, antepartum ([facility])     24  [facility] EFW 72%    If BMI over 30, [  ] Growth u/s at 32 wks          Anxiety    Vaginal bleeding in pregnancy, second trimester ([facility])     24  Normal level 2 with Spiritism. No further recs for bleeding.   24  Seen in ED with bright red bleeding and viable IUP on US. Change plan for MFM consult and level 2 at 20 weeks. Message to staff.   24   As long as no recurrence after this, we'll plan level 1. If recurrent bleeding, then MFM consult / level 2. Check [facility] UA with culture reflex.        Gross hematuria    Genetic screening     MaterniT-21 at [facility] prior to transfer. Low risk w/ ? Gender.       Cholelithiasis during pregnancy ([facility])     Was scheduled at [place] for Lap Rona prior to positive HPT. Discussed diet and we may need surgeon here if any exacerbations.       GBS (group B streptococcus) UTI complicating pregnancy ([facility])     Needs abx in labor       Obstetric History:   OB History    Para Term  AB Living   1             SAB IAB Ectopic Multiple Live Births                  # Outcome Date GA Lbr Jase/2nd Weight Sex Type Anes PTL Lv   1 Current              Past Medical History:   Past  Medical History:    Essential hypertension    Hyperglycemia     Past Social History:   Past Surgical History:   Procedure Laterality Date    Ankle fracture surgery Right 1999    Surgical repair of Broken ankle-Right (as per NG)     Family History:   Family History   Problem Relation Age of Onset    Breast Cancer Mother         In 2021    Glaucoma Mother     Diabetes Maternal Grandmother     High Blood Pressure Maternal Grandmother     Lipids Maternal Grandmother     Heart Attack Maternal Grandmother 50        (as per NG)    Breast Cancer Maternal Grandmother     Macular degeneration Paternal Grandmother     Allergies Brother         (as per NG)    Cancer Other         MGM sister     Social History:   Social History     Tobacco Use    Smoking status: Never    Smokeless tobacco: Never   Substance Use Topics    Alcohol use: Yes     Comment: Socially, 1 beer, last month (as per NG)       Home Meds:   Medications Prior to Admission   Medication Sig Dispense Refill Last Dose    prenatal vitamin with DHA 27-0.8-228 MG Oral Cap Take 1 capsule by mouth daily.   8/5/2024     Allergies:   Allergies   Allergen Reactions    Shellfish-Derived Products OTHER (SEE COMMENTS)       OBJECTIVE:    Pulse:  [80-88] 80  BP: ()/(55-80) 93/55    General: Alert and Oriented  Abdomen: Soft, Gravid  Leopolds: vertex    Cervix 2/60/-3       135 BPM, Fetal heart variability: moderate and nonreactive  Decelerations: No  Contractions: No    Lab Review:  Results for orders placed or performed in visit on 08/06/24   POC Urinalysis, Manual Dip without microscopy [30820]   Result Value Ref Range    Glucose Urine Negative Negative mg/dL    Bilirubin Urine Negative Negative    Ketones, UA Negative Negative - Trace mg/dL    Spec Gravity 1.020 1.005 - 1.030    Blood Urine Moderate (A) Negative    PH Urine 6.5 5.0 - 8.0    Protein Urine 30 (A) Negative - Trace mg/dL    Urobilinogen Urine 0.2 0.2 - 1.0 mg/dL    Nitrite Urine Negative Negative     Leukocyte Esterase Urine Small (A) Negative    APPEARANCE clear Clear    Color Urine yellow Yellow    Multistix Lot# [id number] Numeric    Multistix Expiration Date 2024 Date        ASSESSMENT:    Patient is a  at 41w0d  Patient Active Problem List   Diagnosis    GBS (group B streptococcus) UTI complicating pregnancy ([facility])    Genetic screening    Cholelithiasis during pregnancy ([facility])    Vaginal bleeding in pregnancy, second trimester ([facility])    Gross hematuria    Obesity affecting pregnancy, antepartum ([facility])    Anxiety    Proteinuria complicating pregnancy, third trimester ([facility])    Pregnancy ([facility])         PLAN:    Admit  Cefm/toco  FHTs reassuring   GBS positive - Ampicillin when active  Cooks catheter      Yuni Gomes MD  2024  12:59 PM

## 2024-08-06 NOTE — PROGRESS NOTES
Pt is a 34 year old female admitted to TR5/TR5-A.     Chief Complaint   Patient presents with    Non-stress Test     Sent from office for post dates NST      Pt is  41w0d intra-uterine pregnancy.  History obtained, consents signed. Oriented to room, staff, and plan of care.

## 2024-08-07 ENCOUNTER — ANESTHESIA (OUTPATIENT)
Dept: OBGYN UNIT | Facility: HOSPITAL | Age: 34
End: 2024-08-07
Payer: COMMERCIAL

## 2024-08-07 ENCOUNTER — ANESTHESIA EVENT (OUTPATIENT)
Dept: OBGYN UNIT | Facility: HOSPITAL | Age: 34
End: 2024-08-07
Payer: COMMERCIAL

## 2024-08-07 ENCOUNTER — TELEPHONE (OUTPATIENT)
Dept: OBGYN CLINIC | Facility: CLINIC | Age: 34
End: 2024-08-07

## 2024-08-07 PROBLEM — O28.8 NST (NON-STRESS TEST) NONREACTIVE: Status: ACTIVE | Noted: 2024-08-07

## 2024-08-07 PROBLEM — O48.0 POST-TERM PREGNANCY, 40-42 WEEKS OF GESTATION (HCC): Status: ACTIVE | Noted: 2024-08-07

## 2024-08-07 PROCEDURE — 59400 OBSTETRICAL CARE: CPT | Performed by: OBSTETRICS & GYNECOLOGY

## 2024-08-07 PROCEDURE — 10907ZC DRAINAGE OF AMNIOTIC FLUID, THERAPEUTIC FROM PRODUCTS OF CONCEPTION, VIA NATURAL OR ARTIFICIAL OPENING: ICD-10-PCS | Performed by: OBSTETRICS & GYNECOLOGY

## 2024-08-07 PROCEDURE — 3E033VJ INTRODUCTION OF OTHER HORMONE INTO PERIPHERAL VEIN, PERCUTANEOUS APPROACH: ICD-10-PCS | Performed by: OBSTETRICS & GYNECOLOGY

## 2024-08-07 RX ORDER — LIDOCAINE HYDROCHLORIDE 10 MG/ML
INJECTION, SOLUTION INFILTRATION; PERINEURAL
Status: COMPLETED | OUTPATIENT
Start: 2024-08-07 | End: 2024-08-07

## 2024-08-07 RX ORDER — CHOLECALCIFEROL (VITAMIN D3) 25 MCG
1 TABLET,CHEWABLE ORAL DAILY
Status: DISCONTINUED | OUTPATIENT
Start: 2024-08-07 | End: 2024-08-09

## 2024-08-07 RX ORDER — ACETAMINOPHEN 500 MG
1000 TABLET ORAL EVERY 6 HOURS PRN
Status: DISCONTINUED | OUTPATIENT
Start: 2024-08-07 | End: 2024-08-09

## 2024-08-07 RX ORDER — SIMETHICONE 80 MG
80 TABLET,CHEWABLE ORAL 3 TIMES DAILY PRN
Status: DISCONTINUED | OUTPATIENT
Start: 2024-08-07 | End: 2024-08-09

## 2024-08-07 RX ORDER — IBUPROFEN 600 MG/1
600 TABLET ORAL EVERY 6 HOURS
Status: DISCONTINUED | OUTPATIENT
Start: 2024-08-07 | End: 2024-08-09

## 2024-08-07 RX ORDER — ONDANSETRON 2 MG/ML
4 INJECTION INTRAMUSCULAR; INTRAVENOUS EVERY 6 HOURS PRN
Status: DISCONTINUED | OUTPATIENT
Start: 2024-08-07 | End: 2024-08-09

## 2024-08-07 RX ORDER — TRANEXAMIC ACID 10 MG/ML
1000 INJECTION, SOLUTION INTRAVENOUS ONCE AS NEEDED
Status: ACTIVE | OUTPATIENT
Start: 2024-08-07 | End: 2024-08-07

## 2024-08-07 RX ORDER — DOCUSATE SODIUM 100 MG/1
100 CAPSULE, LIQUID FILLED ORAL
Status: DISCONTINUED | OUTPATIENT
Start: 2024-08-07 | End: 2024-08-09

## 2024-08-07 RX ORDER — BUPIVACAINE HYDROCHLORIDE 2.5 MG/ML
INJECTION, SOLUTION EPIDURAL; INFILTRATION; INTRACAUDAL
Status: COMPLETED | OUTPATIENT
Start: 2024-08-07 | End: 2024-08-07

## 2024-08-07 RX ORDER — ACETAMINOPHEN 500 MG
500 TABLET ORAL EVERY 6 HOURS PRN
Status: DISCONTINUED | OUTPATIENT
Start: 2024-08-07 | End: 2024-08-09

## 2024-08-07 RX ORDER — NALBUPHINE HYDROCHLORIDE 10 MG/ML
2.5 INJECTION, SOLUTION INTRAMUSCULAR; INTRAVENOUS; SUBCUTANEOUS
Status: DISCONTINUED | OUTPATIENT
Start: 2024-08-07 | End: 2024-08-07

## 2024-08-07 RX ORDER — BUPIVACAINE HCL/0.9 % NACL/PF 0.25 %
5 PLASTIC BAG, INJECTION (ML) EPIDURAL AS NEEDED
Status: DISCONTINUED | OUTPATIENT
Start: 2024-08-07 | End: 2024-08-07

## 2024-08-07 RX ORDER — BENZOCAINE/MENTHOL 6 MG-10 MG
1 LOZENGE MUCOUS MEMBRANE EVERY 6 HOURS PRN
Status: DISCONTINUED | OUTPATIENT
Start: 2024-08-07 | End: 2024-08-09

## 2024-08-07 RX ORDER — BUPIVACAINE HYDROCHLORIDE 2.5 MG/ML
20 INJECTION, SOLUTION EPIDURAL; INFILTRATION; INTRACAUDAL ONCE
Status: DISCONTINUED | OUTPATIENT
Start: 2024-08-07 | End: 2024-08-07 | Stop reason: HOSPADM

## 2024-08-07 RX ORDER — AMMONIA INHALANTS 0.04 G/.3ML
0.3 INHALANT RESPIRATORY (INHALATION) AS NEEDED
Status: DISCONTINUED | OUTPATIENT
Start: 2024-08-07 | End: 2024-08-09

## 2024-08-07 RX ORDER — LIDOCAINE HYDROCHLORIDE AND EPINEPHRINE 15; 5 MG/ML; UG/ML
INJECTION, SOLUTION EPIDURAL
Status: COMPLETED | OUTPATIENT
Start: 2024-08-07 | End: 2024-08-07

## 2024-08-07 RX ADMIN — BUPIVACAINE HYDROCHLORIDE 5 ML: 2.5 INJECTION, SOLUTION EPIDURAL; INFILTRATION; INTRACAUDAL at 05:45:00

## 2024-08-07 RX ADMIN — LIDOCAINE HYDROCHLORIDE 5 ML: 10 INJECTION, SOLUTION INFILTRATION; PERINEURAL at 05:45:00

## 2024-08-07 RX ADMIN — LIDOCAINE HYDROCHLORIDE AND EPINEPHRINE 5 ML: 15; 5 INJECTION, SOLUTION EPIDURAL at 05:45:00

## 2024-08-07 NOTE — ANESTHESIA PREPROCEDURE EVALUATION
Anesthesia PreOp Note    HPI:     Gely Erwin is a 34 year old female who presents for preoperative consultation requested by: * No surgeons listed *    Date of Surgery: 8/7/2024    * No procedures listed *  Indication: * No pre-op diagnosis entered *    Relevant Problems   No relevant active problems       NPO:                         History Review:  Patient Active Problem List    Diagnosis Date Noted    Pregnancy ([facility]) 08/06/2024    Proteinuria complicating pregnancy, third trimester ([facility]) 06/20/2024    Obesity affecting pregnancy, antepartum ([facility]) 04/01/2024    Anxiety 04/01/2024    Vaginal bleeding in pregnancy, second trimester ([facility]) 02/22/2024    Gross hematuria 02/22/2024    Genetic screening 02/07/2024    Cholelithiasis during pregnancy ([facility]) 02/07/2024    GBS (group B streptococcus) UTI complicating pregnancy ([facility]) 02/04/2024       Past Medical History:    Essential hypertension    Hyperglycemia       Past Surgical History:   Procedure Laterality Date    Ankle fracture surgery Right 1999    Surgical repair of Broken ankle-Right (as per )       Medications Prior to Admission   Medication Sig Dispense Refill Last Dose    prenatal vitamin with DHA 27-0.8-228 MG Oral Cap Take 1 capsule by mouth daily.   8/5/2024     Current Facility-Administered Medications Ordered in Epic   Medication Dose Route Frequency Provider Last Rate Last Admin    lactated ringers IV bolus 1,000 mL  1,000 mL Intravenous Once Supa Liang MD        fentaNYL-bupivacaine 2 mcg/mL-0.125% in sodium chloride 0.9% 100 mL EPIDURAL infusion premix   Epidural Continuous Supa Liang MD        fentaNYL (Sublimaze) 50 mcg/mL injection 100 mcg  100 mcg Epidural Once Supa Liang MD        bupivacaine PF (Marcaine) 0.25% injection  20 mL Epidural Once Supa Liang MD        EPHEDrine (PF) 25 MG/5 ML injection 5 mg  5 mg Intravenous PRN Supa Liang MD        nalbuphine (Nubain) 10 mg/mL  injection 2.5 mg  2.5 mg Intravenous Q15 Min PRN Supa Liang MD        acetaminophen (Tylenol Extra Strength) tab 500 mg  500 mg Oral Q6H PRN Yuni Gomes MD        acetaminophen (Tylenol Extra Strength) tab 1,000 mg  1,000 mg Oral Q6H PRN Yuni Gomes MD        ibuprofen (Motrin) tab 600 mg  600 mg Oral Once PRN Yuni Gomes MD        ondansetron (Zofran) 4 MG/2ML injection 4 mg  4 mg Intravenous Q6H PRN Yuni Gomes MD        oxyTOCIN in sodium chloride 0.9% (Pitocin) 30 Units/500mL infusion premix  62.5-900 senthil-units/min Intravenous Continuous Yuni Gomes MD        terbutaline (Brethine) 1 MG/ML injection 0.25 mg  0.25 mg Subcutaneous PRN Yuni Gomes MD        sodium citrate-citric acid (Bicitra) 500-334 MG/5ML oral solution 30 mL  30 mL Oral PRN Yuni Gomes MD        lidocaine PF (Xylocaine-MPF) 1% injection  30 mL Intradermal Once Yuni Gomes MD        lactated ringers infusion   Intravenous Continuous Yuni Gomes  mL/hr at 08/06/24 2115 New Bag at 08/06/24 2115    dextrose in lactated ringers 5% infusion   Intravenous PRN Yuni Gomes MD        lactated ringers IV bolus 500 mL  500 mL Intravenous PRN Yuni Gomes MD        ampicillin (Omnipen) 1 g in sodium chloride 0.9% 100 mL IVPB-MBP  1 g Intravenous Q4H Yuni Gomes  mL/hr at 08/07/24 0431 1 g at 08/07/24 0431    oxyTOCIN in sodium chloride 0.9% (Pitocin) 30 Units/500mL infusion premix  0.5-10 senthil-units/min Intravenous Continuous Yuni Gomes MD 1 mL/hr at 08/07/24 0052 1 senthil-units/min at 08/07/24 0052    oxyTOCIN in sodium chloride 0.9% (Pitocin) 30 Units/500mL infusion premix  0.5-20 senthil-units/min Intravenous Continuous Yuni Gomes MD 4 mL/hr at 08/07/24 0500 4 senthil-units/min at 08/07/24 0500     No current Epic-ordered outpatient medications on file.       Allergies   Allergen Reactions    Shellfish-Derived Products OTHER (SEE COMMENTS)       Family History    Problem Relation Age of Onset    Breast Cancer Mother         In 2021    Glaucoma Mother     Diabetes Maternal Grandmother     High Blood Pressure Maternal Grandmother     Lipids Maternal Grandmother     Heart Attack Maternal Grandmother 50        (as per NG)    Breast Cancer Maternal Grandmother     Macular degeneration Paternal Grandmother     Allergies Brother         (as per NG)    Cancer Other         MGM sister     Social History     Socioeconomic History    Marital status:    Tobacco Use    Smoking status: Never    Smokeless tobacco: Never   Vaping Use    Vaping status: Never Used   Substance and Sexual Activity    Alcohol use: Yes     Comment: Socially, 1 beer, last month (as per NG)    Drug use: No   Other Topics Concern    Caffeine Concern Yes     Comment: Coffee, Unknown amount (as per NG)       Available pre-op labs reviewed.  Lab Results   Component Value Date    WBC 9.0 08/06/2024    RBC 4.47 08/06/2024    HGB 12.5 08/06/2024    HCT 36.0 08/06/2024    MCV 80.5 08/06/2024    MCH 28.0 08/06/2024    MCHC 34.7 08/06/2024    RDW 14.3 08/06/2024    .0 08/06/2024             Vital Signs:  Body mass index is 39.23 kg/m².   height is 1.727 m (5' 8\") and weight is 117 kg (258 lb). Her oral temperature is 97.9 °F (36.6 °C). Her blood pressure is 108/59 and her pulse is 85. Her respiration is 20.   Vitals:    08/06/24 1645 08/06/24 2100 08/07/24 0015 08/07/24 0445   BP: 115/51 139/69 110/60 108/59   Pulse: 91 87 88 85   Resp:  19 16 20   Temp: 98.3 °F (36.8 °C) 98.7 °F (37.1 °C) 98 °F (36.7 °C) 97.9 °F (36.6 °C)   TempSrc: Oral Oral Oral Oral   Weight:       Height:            Anesthesia Evaluation     Patient summary reviewed and Nursing notes reviewed    Airway   Mallampati: II  TM distance: >3 FB  Neck ROM: full  Dental - Dentition appears grossly intact     Pulmonary - negative ROS and normal exam   Cardiovascular - normal exam  (+) hypertension    Neuro/Psych    (+)  anxiety/panic attacks,         GI/Hepatic/Renal - negative ROS     Endo/Other    Abdominal                  Anesthesia Plan:   ASA:  2  Emergent    Plan:   Epidural  Post-op Pain Management: IV analgesics  Informed Consent Plan and Risks Discussed With:  Patient  Use of Blood Products Discussed With:  Patient  Blood Product Use Consented    Discussed plan with:  Surgeon      I have informed Gely Timmonsuilar Keriskip and/or legal guardian or family member of the nature of the anesthetic plan, benefits, risks including possible dental damage if relevant, major complications, and any alternative forms of anesthetic management.   All of the patient's questions were answered to the best of my ability. The patient desires the anesthetic management as planned.  CHARLEEN GIRON MD  8/7/2024 6:19 AM  Present on Admission:  **None**

## 2024-08-07 NOTE — PLAN OF CARE
Problem: BIRTH - VAGINAL/ SECTION  Goal: Fetal and maternal status remain reassuring during the birth process  Description: INTERVENTIONS:  - Monitor vital signs  - Monitor fetal heart rate  - Monitor uterine activity  - Monitor labor progression (vaginal delivery)  - DVT prophylaxis (C/S delivery)  - Surgical antibiotic prophylaxis (C/S delivery)  Outcome: Progressing     Problem: PAIN - ADULT  Goal: Verbalizes/displays adequate comfort level or patient's stated pain goal  Description: INTERVENTIONS:  - Encourage pt to monitor pain and request assistance  - Assess pain using appropriate pain scale  - Administer analgesics based on type and severity of pain and evaluate response  - Implement non-pharmacological measures as appropriate and evaluate response  - Consider cultural and social influences on pain and pain management  - Manage/alleviate anxiety  - Utilize distraction and/or relaxation techniques  - Monitor for opioid side effects  - Notify MD/LIP if interventions unsuccessful or patient reports new pain  - Anticipate increased pain with activity and pre-medicate as appropriate  Outcome: Progressing     Problem: ANXIETY  Goal: Will report anxiety at manageable levels  Description: INTERVENTIONS:  - Administer medication as ordered  - Teach and rehearse alternative coping skills  - Provide emotional support with 1:1 interaction with staff  Outcome: Progressing     Problem: Patient Centered Care  Goal: Patient preferences are identified and integrated in the patient's plan of care  Description: Interventions:  - What would you like us to know as we care for you? Baby Girl  - Provide timely, complete, and accurate information to patient/family  - Incorporate patient and family knowledge, values, beliefs, and cultural backgrounds into the planning and delivery of care  - Encourage patient/family to participate in care and decision-making at the level they choose  - Honor patient and family perspectives  and choices  Outcome: Progressing   Patient's Long Term Goal:  Uncomplicated Vaginal Delivery    Interventions:  -Assessment  -Induction/Augmentation per protocol and MD order  -Education  -Intervention per protocol with education  -involve patient/family in POC  -See additional Care Plan goals for specific interventions    Patient's Short Term Goal:  Comfort and Pain Control    Interventions:  -Non Pharmacological pain interventions  -IV/IM and epidural pain medication per physician order and patient's request  -Education  -Involve patient in POC

## 2024-08-07 NOTE — ANESTHESIA PROCEDURE NOTES
Labor Analgesia    Date/Time: 8/7/2024 5:45 AM    Performed by: Supa Liang MD  Authorized by: Supa Liang MD      General Information and Staff    Start Time:  8/7/2024 5:45 AM  End Time:  8/7/2024 5:55 AM  Anesthesiologist:  Supa Liang MD  Performed by:  Anesthesiologist  Patient Location:  OB  Site Identification: surface landmarks    Reason for Block: labor epidural    Preanesthetic Checklist: patient identified, IV checked, risks and benefits discussed, monitors and equipment checked, pre-op evaluation, timeout performed, anesthesia consent and sterile technique used      Procedure Details    Patient Position:  Sitting  Prep: Betadine and patient draped    Monitoring:  Heart rate, cardiac monitor and continuous pulse ox  Approach:  Midline    Epidural Needle    Injection Technique:  CECE air  Needle Type:  Tuohy  Needle Gauge:  18 G  Needle Length:  3.375 in  Needle Insertion Depth:  7  Location:  L3-4    Spinal Needle      Catheter    Catheter Type:  Side hole  Catheter Size:  20 G  Catheter at Skin Depth:  14  Test Dose:  Negative    Assessment    Sensory Level:  T8    Additional Comments

## 2024-08-08 LAB
BASOPHILS # BLD AUTO: 0.04 X10(3) UL (ref 0–0.2)
BASOPHILS NFR BLD AUTO: 0.3 %
DEPRECATED RDW RBC AUTO: 42.6 FL (ref 35.1–46.3)
EOSINOPHIL # BLD AUTO: 0.15 X10(3) UL (ref 0–0.7)
EOSINOPHIL NFR BLD AUTO: 1 %
ERYTHROCYTE [DISTWIDTH] IN BLOOD BY AUTOMATED COUNT: 14.6 % (ref 11–15)
HCT VFR BLD AUTO: 29.4 %
HGB BLD-MCNC: 10.3 G/DL
IMM GRANULOCYTES # BLD AUTO: 0.08 X10(3) UL (ref 0–1)
IMM GRANULOCYTES NFR BLD: 0.5 %
LYMPHOCYTES # BLD AUTO: 3.26 X10(3) UL (ref 1–4)
LYMPHOCYTES NFR BLD AUTO: 21.5 %
MCH RBC QN AUTO: 28.1 PG (ref 26–34)
MCHC RBC AUTO-ENTMCNC: 35 G/DL (ref 31–37)
MCV RBC AUTO: 80.3 FL
MONOCYTES # BLD AUTO: 0.9 X10(3) UL (ref 0.1–1)
MONOCYTES NFR BLD AUTO: 5.9 %
NEUTROPHILS # BLD AUTO: 10.7 X10 (3) UL (ref 1.5–7.7)
NEUTROPHILS # BLD AUTO: 10.7 X10(3) UL (ref 1.5–7.7)
NEUTROPHILS NFR BLD AUTO: 70.8 %
PLATELET # BLD AUTO: 215 10(3)UL (ref 150–450)
RBC # BLD AUTO: 3.66 X10(6)UL
WBC # BLD AUTO: 15.1 X10(3) UL (ref 4–11)

## 2024-08-08 NOTE — DISCHARGE INSTRUCTIONS
Call your doctor if you are experiencing heavy bleeding, increased pain not relieved with medication, temperature over 100.3, severe headache, change in vision, calf pain/swelling, change in mood/depression.    .Driving:  When driving maneuvers feel safe. Not taking narcotics.    Nothing in the vagina for 6 weeks    No heaving lifting over 10-15# until cleared by OB.       What to Expect:  Abdominal cramping after delivery especially if you are breastfeeding.   Vaginal bleeding for about 4-6 weeks that may be followed by a yellow or white discharge for a few more weeks.  Your period will resume in approximately 6-8 weeks, unless you are breastfeeding.    If you are bottle feeding, you may notice breast engorgement in about 3 days.  Your breast may be sore and hard. Please wear a tight fitted bra or sports bra for 24-36 hours to help prevent your breast from producing milk, and use ice packs to relive any discomfort.  If you are breastfeeding, nipple dryness is very common the first few days.    Constipation is common after having a baby.  Please increase fluid and fiber in your diet.       Over-The-Counter Medication  Non-prescription anti-inflammatory medications can also help to ease the pain.  You may take Aleve, Tylenol or Ibuprofen   Colace or Metamucil for Constipation  Lanolin for dry nipples  Tucks, Witch Hazel and Epifoam for vaginal/perineum discomfort.   Drink a full glass of water with oral medication and take as directed.     Wound Care  The following instructions will promote proper healing and help to prevent infection  Vaginal/perineum Care: Sitz Bath for 15mins, 2-3 times a day,     Bathing/Showers  You may resume showers  No baths, swimming, hot tubs until your post-partum visit     Home Medication  Resume your home medications as instructed     Diet  Resume your normal diet     Activity  Refrain from vaginal intercourse, vaginal suppositories, tampon use or douches until after your post-partum  visit.  No exercising for 4 weeks  You may climb stairs minimally for the 1st week.    Do not do heavy housework for at least 2-3 weeks     Return to Work or School  You may return to work in approximately 6 weeks  Contact your obstetrician’s office, if you need a medical release.      Driving  Avoid driving if you are taking narcotics for pain relief.     Follow-up Appointment with Your Obstetrician  Call your obstetrician’s office today for an appointment in 4-6 weeks.    Verify your appointment date, day, time, and location.  At your 1st post-partum office visit:  Your progress will be evaluated, findings reviewed, and any additional concerns and instructions will be discussed.     Questions or Concerns  Call your obstetrician’s office if you experience the following:  Severe pain not controlled by pain medication  Foul smelling vaginal discharge  Heavy bleeding  Shortness of breath  Fever  Redness, increased swelling or drainage from your incision  Crying and periods of sadness that prevents you from caring for yourself and your baby  Burning sensation during urination or inability to urinate  Swelling, redness or abnormal warmth to your leg/calf   If your call is made after office hours, a physician will be available to help you.  There is always a provider covering our patients.

## 2024-08-08 NOTE — PROGRESS NOTES
Patient up to bathroom with assist x 1.  Unable to void at this time. Patient transferred to mother/baby room 372 per wheelchair in stable condition with baby and personal belongings.  Accompanied by significant other and staff.  Report given to mother/baby RN.

## 2024-08-08 NOTE — PLAN OF CARE
Problem: PAIN - ADULT  Goal: Verbalizes/displays adequate comfort level or patient's stated pain goal  Description: INTERVENTIONS:  - Encourage pt to monitor pain and request assistance  - Assess pain using appropriate pain scale  - Administer analgesics based on type and severity of pain and evaluate response  - Implement non-pharmacological measures as appropriate and evaluate response  - Consider cultural and social influences on pain and pain management  - Manage/alleviate anxiety  - Utilize distraction and/or relaxation techniques  - Monitor for opioid side effects  - Notify MD/LIP if interventions unsuccessful or patient reports new pain  - Anticipate increased pain with activity and pre-medicate as appropriate  Outcome: Progressing     Problem: POSTPARTUM  Goal: Long Term Goal:Experiences normal postpartum course  Description: INTERVENTIONS:  - Assess and monitor vital signs and lab values.  - Assess fundus and lochia.  - Provide ice/sitz baths for perineum discomfort.  - Monitor healing of incision/episiotomy/laceration, and assess for signs and symptoms of infection and hematoma.  - Assess bladder function and monitor for bladder distention.  - Provide/instruct/assist with pericare as needed.  - Provide VTE prophylaxis as needed.  - Monitor bowel function.  - Encourage ambulation and provide assistance as needed.  - Assess and monitor emotional status and provide social service/psych resources as needed.  - Utilize standard precautions and use personal protective equipment as indicated. Ensure aseptic care of all intravenous lines and invasive tubes/drains.  - Obtain immunization and exposure to communicable diseases history.  Outcome: Progressing  Goal: Optimize infant feeding at the breast  Description: INTERVENTIONS:  - Initiate breast feeding within first hour after birth.   - Monitor effectiveness of current breast feeding efforts.  - Assess support systems available to mother/family.  - Identify  cultural beliefs/practices regarding lactation, letdown techniques, maternal food preferences.  - Assess mother's knowledge and previous experience with breast feeding.  - Provide information as needed about early infant feeding cues (e.g., rooting, lip smacking, sucking fingers/hand) versus late cue of crying.  - Discuss/demonstrate breast feeding aids (e.g., infant sling, nursing footstool/pillows, and breast pumps).  - Encourage mother/other family members to express feelings/concerns, and actively listen.  - Educate father/SO about benefits of breast feeding and how to manage common lactation challenges.  - Recommend avoidance of specific medications or substances incompatible with breast feeding.  - Assess and monitor for signs of nipple pain/trauma.  - Instruct and provide assistance with proper latch.  - Review techniques for milk expression (breast pumping) and storage of breast milk. Provide pumping equipment/supplies, instructions and assistance, as needed.  - Encourage rooming-in and breast feeding on demand.  - Encourage skin-to-skin contact.  - Provide LC support as needed.  - Assess for and manage engorgement.  - Provide breast feeding education handouts and information on community breast feeding support.   Outcome: Progressing  Goal: Establishment of adequate milk supply with medication/procedure interruptions  Description: INTERVENTIONS:  - Review techniques for milk expression (breast pumping).   - Provide pumping equipment/supplies, instructions, and assistance until it is safe to breastfeed infant.  Outcome: Progressing  Goal: Experiences normal breast weaning course  Description: INTERVENTIONS:  - Assess for and manage engorgement.  - Instruct on breast care.  - Provide comfort measures.  Outcome: Progressing  Goal: Appropriate maternal -  bonding  Description: INTERVENTIONS:  - Assess caregiver- interactions.  - Assess caregiver's emotional status and coping mechanisms.  -  Encourage caregiver to participate in  daily care.  - Assess support systems available to mother/family.  - Provide /case management support as needed.  Outcome: Progressing     Problem: BIRTH - VAGINAL/ SECTION  Goal: Fetal and maternal status remain reassuring during the birth process  Description: INTERVENTIONS:  - Monitor vital signs  - Monitor fetal heart rate  - Monitor uterine activity  - Monitor labor progression (vaginal delivery)  - DVT prophylaxis (C/S delivery)  - Surgical antibiotic prophylaxis (C/S delivery)  Outcome: Completed     Problem: ANXIETY  Goal: Will report anxiety at manageable levels  Description: INTERVENTIONS:  - Administer medication as ordered  - Teach and rehearse alternative coping skills  - Provide emotional support with 1:1 interaction with staff  Outcome: Completed

## 2024-08-08 NOTE — LACTATION NOTE
08/08/24 1500   Evaluation Type   Evaluation Type Inpatient   Problems identified   Problems identified Milk supply WNL   Maternal history   Maternal history Anxiety   Breastfeeding goal   Breastfeeding goal To maintain breast milk feeding per patient goal   Maternal Assessment   Bilateral Breasts Symmetrical;Soft   Bilateral Nipples Everted   Prior breastfeeding experience (comment below) Pumped & bottle fed;Primip   Breastfeeding Assistance Breastfeeding assistance provided with permission;Pumping assistance provided with permission;Hand expression provided with permission;Breast exam provided with permission   Pain assessment   Pain scale comment denies   Treatment of Sore Nipples Expressed breast milk;Deeper latch techniques   Guidelines for use of:   Equipment Nipple shield   Breast pump type Ameda Platinum;Spectra   Current use of pump: initiated electric   Suggested use of pump Pump 8-12X/24hr;Pump after nursing if a nipple shield is used;For comfort as needed;Pump each time a supplement is offered;Pump if infant is not latching to breast   Reported pumping volumes (ml) 7   Other (comment) LC assistance offered. Patient has been supplementing and breastfeeding. Mother is using the nipple shield. It was already given prior to LC visit. Sustained nutritive latch achieved in cross cradle on the left and attempted cross cradle in the right side but baby tired out. Active swallows noted with minimual stimulation.  Milk was noted in the shield after baby came off. S/S of adequete feeds were discussed. Reviewed I&O's of baby. LC initated breast pumping and patient was able to pump 7ml. Pumping guidelines reviewed. Mother is also supplementing with formula, per her request. LC encouraged her to attempt to supplement with 15-20ml depending on how well the feeding goes.  outpatient information were provided. LC to follow-up.

## 2024-08-08 NOTE — DISCHARGE SUMMARY
[facility]  part of [facility]    Discharge Summary    Gely Erwin Patient Status:  Inpatient    1990 MRN [id number]   Location [facility] 3SE Attending Yuni Gomes MD   Hosp Day # 1       Admit date:  2024    Discharge date: 2024    Delivering OB Clinician: Leticia    EDC: Estimated Date of Delivery: 24    Gestational Age: 41w1d    Antepartum complications:   Patient Active Problem List   Diagnosis    GBS (group B streptococcus) UTI complicating pregnancy (Formerly [facility])    Genetic screening    Cholelithiasis during pregnancy ([facility]ly [facility])    Vaginal bleeding in pregnancy, second trimester ([facility]ly [facility])    Gross hematuria    Obesity affecting pregnancy, antepartum (Formerly [facility])    Anxiety    Proteinuria complicating pregnancy, third trimester (Formerly [facility])    Pregnancy (Formerly [facility])    NST (non-stress test) nonreactive    Post-term pregnancy, 40-42 weeks of gestation (Formerly [facility])    Shoulder dystocia during labor and delivery ([facility]ly [facility])       Date of Delivery: 2024  Time of Delivery: 2:44 PM     Delivery Type: spontaneous vaginal delivery    Baby: Liveborn female   Information for the patient's :  Essie Erwin [[id number]]   10 lb 3 oz (4.62 kg)   Apgars:  1 minute: 8   5 minutes: 9 10 minutes:       Intrapartum Complications: Shoulder Dystocia- right      Hospital Course: No complications. IOL for non-reactive post dates NST. Cook's cath, IV pitocin, epidural, AROM and progressed to . 1 minute 40 second dystocia. Routine delivery and postpartum care    Discharge Physical Exam:   /81   Pulse 91   Temp 98.7 °F (37.1 °C) (Oral)   Resp 18   Ht 5' 8\" (1.727 m)   Wt 258 lb (117 kg)   LMP 10/24/2023 (Exact Date)   SpO2 100%   Breastfeeding Yes   BMI 39.23 kg/m²   General appearance:  alert, appears stated age, cooperative and no distress  Abdominal: soft, non-tender, no rebound  Uterus: firm, nontender, below umbilicus  Pelvic: deferred  Extremities: Homans sign is negative, no sign  of DVT    Discharged Condition: stable    Disposition: home with self care    Plan:     Follow-up appointment in 6 weeks with Dr. Kelly

## 2024-08-08 NOTE — PROGRESS NOTES
[facility]  part of [facility]    Vaginal Delivery Note    Gely Erwin Patient Status:  Inpatient    1990 MRN [id number]   Location [facility] 3SE Attending Yuni Gomes MD   Hosp Day # 1 PCP None Pcp     Delivery     Infant Info:  Date of Delivery: 2024    Time of Delivery: 2:44 PM   Delivery Type: Normal spontaneous vaginal delivery     Live   Information for the patient's :  Rip Girl [[id number]]   female  infant   Information for the patient's :  Essie Erwin [[id number]]   10 lb 3 oz (4.62 kg)    Apgars:  1 minute: 8    ? Zoie               5 minutes: 9                         10 minutes:      Presentation Vertex [1]     Position   Occiput [1]  Anterior [1]     Delivery Narrative: Patient pushed for 19 minutes prior to delivering a live female infant over intact perineum in HELGA position. Loose nuchal cord reduced Infant was bulb suctioned prior to delivering in toto. Cord doubly clamped & cut. Infant handed to awaiting neonatalogist. Placenta delivered spontaneously, intact and normal in appearance with 3 vessel cord.  Midline episiotomy repaired with 2-0 Chromic in normal usual fashion. Sphincter intact by visual and digital exam. No sulcus, periuretheral, nor cervical lacerations noted. Mother in stable condition.    Maternal Anesthesia: epidural       Delivery Complications:  Shoulder dystocia- right. McRobert's followed by MLE  and great pushing force without resolution. We then called OB emergency while I performed delivery of posterior / left arm. Total time on perineum = 1 minute, 40 seconds    Neonatologist Present: yes    Placenta info:  Date/Time of Delivery: 2024  2:50 PM    Delivery: spontaneous  Placenta to Pathology: no    Cord info:  Cord Gases Submitted: no  Cord Blood Collection: no  Cord Tissue Collection: no  Cord Complications: single nuchal loose    Sponge and Needle Counts:  Verified    QBL:  198 ml    Skip  OLENA Kelly DO   8/7/2024  10:18 PM

## 2024-08-08 NOTE — ANESTHESIA POSTPROCEDURE EVALUATION
Patient: Gely Erwin    Procedure Summary       Date: 08/07/24 Room / Location:     Anesthesia Start: 0545 Anesthesia Stop: 1450    Procedure: LABOR ANALGESIA Diagnosis:     Scheduled Providers:  Anesthesiologist: Jazmyn Jorgensen DO    Anesthesia Type: epidural ASA Status: 2 - Emergent            Anesthesia Type: epidural    Vitals Value Taken Time   /81 08/07/24 1900   Temp 98.7 08/07/24 1900   Pulse 91 08/07/24 1900   Resp 16 08/07/24 1900   SpO2 100 % 08/07/24 1900   Vitals shown include unfiled device data.    [facility] AN Post Evaluation:   Patient Evaluated in floor  Patient Participation: complete - patient participated  Level of Consciousness: awake and alert  Pain Score: 0  Pain Management: satisfactory to patient  Airway Patency:patent  Yes    Nausea/Vomiting: none  Cardiovascular Status: hemodynamically stable  Respiratory Status: nonlabored ventilation, spontaneous ventilation and room air  Postoperative Hydration stable      Jazmyn Jorgensen DO  8/7/2024 7:23 PM

## 2024-08-08 NOTE — PROGRESS NOTES
[facility]  part of [facility]    Post  Progress Note      Gely Erwin Patient Status:  Inpatient    1990 MRN [id number]   Location [facility] 3SE Attending Yuni Gomes MD   Hosp Day # 2 PCP None Pcp       Subjective     Good pain control.  No c/o    Objective   Vital signs in last 24 hours:  Temp:  [97.7 °F (36.5 °C)-98.8 °F (37.1 °C)] 98.5 °F (36.9 °C)  Pulse:  [] 76  Resp:  [18] 18  BP: ()/(44-83) 94/81  SpO2:  [94 %-100 %] 100 %    Physical Exam:  Constitutional: comfortable  Abdomen: soft, nontender  Uterus: fundus firm and nontender  Extremities: No calf tenderness      Results:   CBC:    Lab Results   Component Value Date    WBC 15.1 (H) 2024    WBC 9.0 2024    WBC 9.7 2024     Lab Results   Component Value Date    HGB 10.3 (L) 2024    HGB 12.5 2024    HGB 11.4 (L) 2024      Lab Results   Component Value Date    .0 2024    .0 2024    .0 2024        Assessment/Plan   PPD #  1 --- stable  Routine PP care          Jennifer Bowens MD  2024  11:16 AM

## 2024-08-09 VITALS
DIASTOLIC BLOOD PRESSURE: 73 MMHG | BODY MASS INDEX: 39.1 KG/M2 | HEART RATE: 77 BPM | SYSTOLIC BLOOD PRESSURE: 124 MMHG | RESPIRATION RATE: 16 BRPM | WEIGHT: 258 LBS | TEMPERATURE: 98 F | OXYGEN SATURATION: 100 % | HEIGHT: 68 IN

## 2024-08-09 NOTE — PLAN OF CARE
Problem: Patient/Family Goals  Goal: Patient/Family Long Term Goal  Description: Patient's Long Term Goal:     Interventions:  -   - See additional Care Plan goals for specific interventions  Outcome: Progressing  Goal: Patient/Family Short Term Goal  Description: Patient's Short Term Goal:     Interventions:   -   - See additional Care Plan goals for specific interventions  Outcome: Progressing     Problem: POSTPARTUM  Goal: Long Term Goal:Experiences normal postpartum course  Description: INTERVENTIONS:  - Assess and monitor vital signs and lab values.  - Assess fundus and lochia.  - Provide ice/sitz baths for perineum discomfort.  - Monitor healing of incision/episiotomy/laceration, and assess for signs and symptoms of infection and hematoma.  - Assess bladder function and monitor for bladder distention.  - Provide/instruct/assist with pericare as needed.  - Provide VTE prophylaxis as needed.  - Monitor bowel function.  - Encourage ambulation and provide assistance as needed.  - Assess and monitor emotional status and provide social service/psych resources as needed.  - Utilize standard precautions and use personal protective equipment as indicated. Ensure aseptic care of all intravenous lines and invasive tubes/drains.  - Obtain immunization and exposure to communicable diseases history.  Outcome: Progressing  Goal: Optimize infant feeding at the breast  Description: INTERVENTIONS:  - Initiate breast feeding within first hour after birth.   - Monitor effectiveness of current breast feeding efforts.  - Assess support systems available to mother/family.  - Identify cultural beliefs/practices regarding lactation, letdown techniques, maternal food preferences.  - Assess mother's knowledge and previous experience with breast feeding.  - Provide information as needed about early infant feeding cues (e.g., rooting, lip smacking, sucking fingers/hand) versus late cue of crying.  - Discuss/demonstrate breast feeding  aids (e.g., infant sling, nursing footstool/pillows, and breast pumps).  - Encourage mother/other family members to express feelings/concerns, and actively listen.  - Educate father/SO about benefits of breast feeding and how to manage common lactation challenges.  - Recommend avoidance of specific medications or substances incompatible with breast feeding.  - Assess and monitor for signs of nipple pain/trauma.  - Instruct and provide assistance with proper latch.  - Review techniques for milk expression (breast pumping) and storage of breast milk. Provide pumping equipment/supplies, instructions and assistance, as needed.  - Encourage rooming-in and breast feeding on demand.  - Encourage skin-to-skin contact.  - Provide LC support as needed.  - Assess for and manage engorgement.  - Provide breast feeding education handouts and information on community breast feeding support.   Outcome: Progressing  Goal: Establishment of adequate milk supply with medication/procedure interruptions  Description: INTERVENTIONS:  - Review techniques for milk expression (breast pumping).   - Provide pumping equipment/supplies, instructions, and assistance until it is safe to breastfeed infant.  Outcome: Progressing  Goal: Experiences normal breast weaning course  Description: INTERVENTIONS:  - Assess for and manage engorgement.  - Instruct on breast care.  - Provide comfort measures.  Outcome: Progressing  Goal: Appropriate maternal -  bonding  Description: INTERVENTIONS:  - Assess caregiver- interactions.  - Assess caregiver's emotional status and coping mechanisms.  - Encourage caregiver to participate in  daily care.  - Assess support systems available to mother/family.  - Provide /case management support as needed.  Outcome: Progressing     VSS, afebrile. Voiding freely. Fundus is firm, U/U, bleeding is scant. Plan of care reviewed with pt. Questions and concerns answered. Bonding well with baby.  Will continue with plan of care.

## 2024-08-09 NOTE — LACTATION NOTE
08/09/24 0915   Evaluation Type   Evaluation Type Inpatient   Problems identified   Problems identified Milk supply WNL   Maternal history   Maternal history Anxiety   Breastfeeding goal   Breastfeeding goal To maintain breast milk feeding per patient goal   Maternal Assessment   Bilateral Breasts   (did not assess at time of consult)   Bilateral Nipples   (did not assess at time of consult)   Prior breastfeeding experience (comment below) Primip   Breastfeeding Assistance LC assistance declined at this time   Pain assessment   Pain scale comment denies   Guidelines for use of:   Breast pump type Ameda Platinum;Spectra   Current use of pump: pumping but not consistently pumping   Suggested use of pump Pump 8-12X/24hr;Pump after nursing if a nipple shield is used;For comfort as needed;Pump each time a supplement is offered;Pump if infant is not latching to breast   Other (comment) Patient declined assistance. LC reviewed pumping guidelines.  educated patient on how to use spectra pump at home. Reviewed S/S of adequete feeds and amount for supplementing. All questions answered.  outpatient information was provided.

## 2024-08-09 NOTE — PLAN OF CARE
Problem: Patient/Family Goals  Goal: Patient/Family Long Term Goal  Description: Patient's Long Term Goal:     Interventions:  -   - See additional Care Plan goals for specific interventions  Outcome: Completed  Goal: Patient/Family Short Term Goal  Description: Patient's Short Term Goal:     Interventions:   -   - See additional Care Plan goals for specific interventions  Outcome: Completed     Problem: POSTPARTUM  Goal: Long Term Goal:Experiences normal postpartum course  Description: INTERVENTIONS:  - Assess and monitor vital signs and lab values.  - Assess fundus and lochia.  - Provide ice/sitz baths for perineum discomfort.  - Monitor healing of incision/episiotomy/laceration, and assess for signs and symptoms of infection and hematoma.  - Assess bladder function and monitor for bladder distention.  - Provide/instruct/assist with pericare as needed.  - Provide VTE prophylaxis as needed.  - Monitor bowel function.  - Encourage ambulation and provide assistance as needed.  - Assess and monitor emotional status and provide social service/psych resources as needed.  - Utilize standard precautions and use personal protective equipment as indicated. Ensure aseptic care of all intravenous lines and invasive tubes/drains.  - Obtain immunization and exposure to communicable diseases history.  Outcome: Completed  Goal: Optimize infant feeding at the breast  Description: INTERVENTIONS:  - Initiate breast feeding within first hour after birth.   - Monitor effectiveness of current breast feeding efforts.  - Assess support systems available to mother/family.  - Identify cultural beliefs/practices regarding lactation, letdown techniques, maternal food preferences.  - Assess mother's knowledge and previous experience with breast feeding.  - Provide information as needed about early infant feeding cues (e.g., rooting, lip smacking, sucking fingers/hand) versus late cue of crying.  - Discuss/demonstrate breast feeding aids  (e.g., infant sling, nursing footstool/pillows, and breast pumps).  - Encourage mother/other family members to express feelings/concerns, and actively listen.  - Educate father/SO about benefits of breast feeding and how to manage common lactation challenges.  - Recommend avoidance of specific medications or substances incompatible with breast feeding.  - Assess and monitor for signs of nipple pain/trauma.  - Instruct and provide assistance with proper latch.  - Review techniques for milk expression (breast pumping) and storage of breast milk. Provide pumping equipment/supplies, instructions and assistance, as needed.  - Encourage rooming-in and breast feeding on demand.  - Encourage skin-to-skin contact.  - Provide LC support as needed.  - Assess for and manage engorgement.  - Provide breast feeding education handouts and information on community breast feeding support.   Outcome: Completed  Goal: Establishment of adequate milk supply with medication/procedure interruptions  Description: INTERVENTIONS:  - Review techniques for milk expression (breast pumping).   - Provide pumping equipment/supplies, instructions, and assistance until it is safe to breastfeed infant.  Outcome: Completed  Goal: Experiences normal breast weaning course  Description: INTERVENTIONS:  - Assess for and manage engorgement.  - Instruct on breast care.  - Provide comfort measures.  Outcome: Completed  Goal: Appropriate maternal -  bonding  Description: INTERVENTIONS:  - Assess caregiver- interactions.  - Assess caregiver's emotional status and coping mechanisms.  - Encourage caregiver to participate in  daily care.  - Assess support systems available to mother/family.  - Provide /case management support as needed.  Outcome: Completed     Discharge order received from MD.  Postpartum folder given, discharge medication form reviewed, signed and given to patient. ID Band matched with baby band. Patient  informed when to make a follow-up appointment with OB. Mother is interacting appropriately with baby. Verbalized understanding of follow-up instructions. Discharged in stable condition via wheelchair.

## 2024-08-20 ENCOUNTER — TELEPHONE (OUTPATIENT)
Dept: OBGYN CLINIC | Facility: CLINIC | Age: 34
End: 2024-08-20

## 2024-08-20 RX ORDER — DICLOXACILLIN SODIUM 500 MG
500 CAPSULE ORAL 4 TIMES DAILY
Qty: 28 CAPSULE | Refills: 0 | Status: SHIPPED | OUTPATIENT
Start: 2024-08-20 | End: 2024-08-27

## 2024-08-20 NOTE — TELEPHONE ENCOUNTER
Patient delivered vaginally on 8/7. She started to feel some pelvic soreness a few days ago. Today she is feeling pain in both ovaries. Also concerned that she is having chills and headaches with soreness in one of her breasts. Please advise.

## 2024-08-20 NOTE — TELEPHONE ENCOUNTER
on  with Dr. Kelly. Repaired Midline Episiotomy.     Patient calling indicating for the last 3 days she has noted vaginal/pelvic pain. Patient states it is a 6/10. Does take Tylenol with minimal relief. Patient denies any swelling, odor, burning or discharge. She states bleeding is scant/light flow, no clots or abdominal cramping. Patient is voiding without issues. She is having BM every other day, slightly straining. Patient is also concerned since she noted a sharp shooting pain in her left ovary this morning while in bed that was a 8/10 lasting for 30 seconds. Patient states that pain has gone aware, dull at this time.     She is also breastfeeding, notes that left breast is painful above the nipple area. She has 3 gum ball size lumps in the breast that do go down with feeding, but come back. Breast is red and \"hot\" to touch. Patient is putting baby to breast first, but is not fulling emptying so does pump after. Also states for the last 24 hour she has had the chills, headache 6/10 and body aches. Patient is taking tylenol for the HA, but feels it does not help and the HA is lingering. Patient denies any Pre-E s/s. Patient states no fever.     We did discuss possible mastitis, prevention and treatment recommendations given and sent via [employer]. Informed will reach out to Dr. Acevedo on-call to see if she is a candidate for antibiotic treatment. Informed will also reach to Dr. Kelly tomorrow when back in office if patient can be added on for pelvic exam. To continue Ibuprofen and alternate with Tylenol for now.     To Dr. Acevedo-please review and advise. Thank you.   To Dr. Kelly to see it patient can be added for pelvic exam. Thank you.

## 2024-08-20 NOTE — TELEPHONE ENCOUNTER
Pt informed of [facility] recs. Dicloxacillin 500mg QID x 7 days sent to pharmacy.     Pt informed as soon as DIAMOND responds with date that she can be added for exam we will contact her. Pt verbalized understanding.

## 2024-08-21 ENCOUNTER — POSTPARTUM (OUTPATIENT)
Dept: OBGYN CLINIC | Facility: CLINIC | Age: 34
End: 2024-08-21

## 2024-08-21 VITALS
HEART RATE: 87 BPM | DIASTOLIC BLOOD PRESSURE: 77 MMHG | WEIGHT: 228.81 LBS | BODY MASS INDEX: 35 KG/M2 | SYSTOLIC BLOOD PRESSURE: 123 MMHG

## 2024-08-21 DIAGNOSIS — R52 POSTPARTUM PAIN (HCC): Primary | ICD-10-CM

## 2024-08-21 PROBLEM — O99.210 OBESITY AFFECTING PREGNANCY, ANTEPARTUM (HCC): Status: RESOLVED | Noted: 2024-04-01 | Resolved: 2024-08-21

## 2024-08-21 PROBLEM — O23.40 GBS (GROUP B STREPTOCOCCUS) UTI COMPLICATING PREGNANCY (HCC): Status: RESOLVED | Noted: 2024-02-04 | Resolved: 2024-08-21

## 2024-08-21 PROBLEM — O28.8 NST (NON-STRESS TEST) NONREACTIVE: Status: RESOLVED | Noted: 2024-08-07 | Resolved: 2024-08-21

## 2024-08-21 PROBLEM — O12.13 PROTEINURIA COMPLICATING PREGNANCY, THIRD TRIMESTER (HCC): Status: RESOLVED | Noted: 2024-06-20 | Resolved: 2024-08-21

## 2024-08-21 PROBLEM — R31.0 GROSS HEMATURIA: Status: RESOLVED | Noted: 2024-02-22 | Resolved: 2024-08-21

## 2024-08-21 PROBLEM — O46.92 VAGINAL BLEEDING IN PREGNANCY, SECOND TRIMESTER (HCC): Status: RESOLVED | Noted: 2024-02-22 | Resolved: 2024-08-21

## 2024-08-21 PROBLEM — O48.0 POST-TERM PREGNANCY, 40-42 WEEKS OF GESTATION (HCC): Status: RESOLVED | Noted: 2024-08-07 | Resolved: 2024-08-21

## 2024-08-21 PROBLEM — Z34.90 PREGNANCY (HCC): Status: RESOLVED | Noted: 2024-08-06 | Resolved: 2024-08-21

## 2024-08-21 PROBLEM — Z13.79 GENETIC SCREENING: Status: RESOLVED | Noted: 2024-02-07 | Resolved: 2024-08-21

## 2024-08-21 PROBLEM — B95.1 GBS (GROUP B STREPTOCOCCUS) UTI COMPLICATING PREGNANCY (HCC): Status: RESOLVED | Noted: 2024-02-04 | Resolved: 2024-08-21

## 2024-08-21 PROBLEM — O26.619: Status: RESOLVED | Noted: 2024-02-07 | Resolved: 2024-08-21

## 2024-08-21 PROBLEM — K80.70: Status: RESOLVED | Noted: 2024-02-07 | Resolved: 2024-08-21

## 2024-08-21 PROCEDURE — 99213 OFFICE O/P EST LOW 20 MIN: CPT | Performed by: OBSTETRICS & GYNECOLOGY

## 2024-08-21 NOTE — PROGRESS NOTES
HPI    Gely Erwin is a 34 year old female  here for 6 week post-partum visit.  Patient delivered a  female infant on .  Patient desires condom.for contraception.  Patient is breast feeding.   Patient denies symptoms of depression, [place]  depression scale score of 6 out of 30.She is here 2 weeks postpartum c/o some intermittent sharp pelvic / pubic bone pain lasting about 15-30 seconds in past 3 days. No bowel / bladder issues.       OBSTETRIC HISTORY  OB History    Para Term  AB Living   1 1 1     1   SAB IAB Ectopic Multiple Live Births         0 1      # Outcome Date GA Lbr Jase/2nd Weight Sex Type Anes PTL Lv   1 Term 24 41w1d 10:55 / 00:49 10 lb 3 oz (4.62 kg) F NORMAL SPONT EPI N AHSAN      Complications: Variable decelerations, Late decelerations, Meconium, Group B beta strep +       MEDICATIONS:    Current Outpatient Medications:     dicloxacillin 500 MG Oral Cap, Take 1 capsule (500 mg total) by mouth 4 (four) times daily for 7 days., Disp: 28 capsule, Rfl: 0    prenatal vitamin with DHA 27-0.8-228 MG Oral Cap, Take 1 capsule by mouth daily., Disp: , Rfl:     ALLERGIES:    Allergies   Allergen Reactions    Shellfish-Derived Products OTHER (SEE COMMENTS)       PHYSICAL EXAM  Blood pressure 123/77, pulse 87, weight 228 lb 12.8 oz (103.8 kg), last menstrual period 10/24/2023, currently breastfeeding.  General:  Well nourished, well developed woman in no acute distress  Abdomen:  soft, nontender, no masses  External Genitalia: normal appearance, hair distribution, and no lesions  Vagina:  Normal appearance without lesions, no abnormal discharge  Cervix:  Normal without tenderness on motion  Uterus: normal in size, contour, position, mobility, without tenderness  Adnexa: normal without masses or tenderness  Perineum: well healed perineum for 2 weeks out from delivery.   Anus: no hemorroids       ASSESSMENT/PLAN  Reassurance and guidance on pain.  Six week visit in September.   Patient to return for annual gyne exam in August.

## 2024-09-03 ENCOUNTER — TELEPHONE (OUTPATIENT)
Dept: OBGYN CLINIC | Facility: CLINIC | Age: 34
End: 2024-09-03

## 2024-09-03 RX ORDER — DICLOXACILLIN SODIUM 500 MG
500 CAPSULE ORAL 4 TIMES DAILY
Qty: 28 CAPSULE | Refills: 0 | Status: SHIPPED | OUTPATIENT
Start: 2024-09-03

## 2024-09-03 NOTE — TELEPHONE ENCOUNTER
Patient delivered on 8/7 with Dr. Kelly.  Patient called on 8/20 with pelvic and breast pain.  Patient was reports left breast pain, lumps, heat, and redness in the left breast.  Patient was given dicloxacillin 50mg qid x 7d for mastitis.  Patient states she finished the medication and was feeling much better.  Yesterday she developed left breast pain again.  She also has redness above the left nipple and had a temp on 100.4 yesterday.  Patient states it feels just like the beginning of a breast infection similar to a few weeks ago.  Message to on call provider for recommendations.

## 2024-09-07 ENCOUNTER — TELEPHONE (OUTPATIENT)
Dept: OBGYN UNIT | Facility: HOSPITAL | Age: 34
End: 2024-09-07

## 2024-09-27 ENCOUNTER — POSTPARTUM (OUTPATIENT)
Dept: OBGYN CLINIC | Facility: CLINIC | Age: 34
End: 2024-09-27

## 2024-09-27 VITALS
WEIGHT: 242 LBS | DIASTOLIC BLOOD PRESSURE: 76 MMHG | HEART RATE: 71 BPM | SYSTOLIC BLOOD PRESSURE: 114 MMHG | BODY MASS INDEX: 37 KG/M2

## 2024-09-30 NOTE — PROGRESS NOTES
HPI    Gely Erwin is a 34 year old female  here for 6 week post-partum visit.  Patient delivered a  female infant on 24- [place].  Patient desires .for contraception.  Patient is breast feeding planned 1 year.   Patient has symptoms of depression, [place]  depression scale score of 18 out of 30.She has no thoughts of hurting self or others. She feels she is effective with  but having some daily crying episodes.       OBSTETRIC HISTORY  OB History    Para Term  AB Living   1 1 1     1   SAB IAB Ectopic Multiple Live Births         0 1      # Outcome Date GA Lbr Jase/2nd Weight Sex Type Anes PTL Lv   1 Term 24 41w1d 10:55 / 00:49 10 lb 3 oz (4.62 kg) F NORMAL SPONT EPI N AHSAN      Complications: Variable decelerations, Late decelerations, Meconium, Group B beta strep +       MEDICATIONS:    Current Outpatient Medications:     prenatal vitamin with DHA 27-0.8-228 MG Oral Cap, Take 1 capsule by mouth daily., Disp: , Rfl:     dicloxacillin 500 MG Oral Cap, Take 1 capsule (500 mg total) by mouth 4 (four) times daily. (Patient not taking: Reported on 2024), Disp: 28 capsule, Rfl: 0    ALLERGIES:    Allergies   Allergen Reactions    Shellfish-Derived Products OTHER (SEE COMMENTS)       PHYSICAL EXAM  Blood pressure 114/76, pulse 71, weight 242 lb (109.8 kg), last menstrual period 10/24/2023, currently breastfeeding.  General:  Well nourished, well developed woman in no acute distress  Abdomen:  soft, nontender, no masses  External Genitalia: normal appearance, hair distribution, and no lesions  Vagina:  Normal appearance without lesions, no abnormal discharge  Cervix:  Normal without tenderness on motion  Uterus: normal in size, contour, position, mobility, without tenderness  Adnexa: normal without masses or tenderness  Perineum: well healed perineum  Anus: no hemorroids       ASSESSMENT/PLAN  Discussed all options of birthcontrol including ocps,  minipill, Mirena or Paragard IUD, nuvaring, orthoevra patch, nexplanon, Depoprovera, condoms or tubal sterilization options. Patient has chosen  withdrawal . We discussed depression score and I advised Behavioral Health evaluation. Message sent to our staff to initiate this.   Patient to return for annual gyne exam in August.

## 2024-10-04 ENCOUNTER — TELEPHONE (OUTPATIENT)
Age: 34
End: 2024-10-04

## 2024-10-04 NOTE — TELEPHONE ENCOUNTER
Hello,    Sorry I missed you - I am reaching out from the [facility] Navigation department, following up on an order from your provider's office to assist in connecting you with resources for care. If you would like to discuss this further, please give us a call back at [phone], or for more immediate assistance you can contact our 24-hour help line at [phone]. We look forward to hearing from you soon.    spouse

## 2024-10-15 ENCOUNTER — TELEPHONE (OUTPATIENT)
Dept: OBGYN CLINIC | Facility: CLINIC | Age: 34
End: 2024-10-15

## 2024-10-15 ENCOUNTER — TELEPHONE (OUTPATIENT)
Age: 34
End: 2024-10-15

## 2024-10-15 RX ORDER — DICLOXACILLIN SODIUM 500 MG/1
500 CAPSULE ORAL 4 TIMES DAILY
Qty: 28 CAPSULE | Refills: 0 | Status: SHIPPED | OUTPATIENT
Start: 2024-10-15 | End: 2024-10-22

## 2024-10-15 NOTE — TELEPHONE ENCOUNTER
Reviewed message below with DIAMOND over the phone. Verbal for pt to start Dicloxacillin 500mg QID x 7 days. RX sent to pharmacy and pt informed.

## 2024-10-15 NOTE — TELEPHONE ENCOUNTER
Pt had  with DIAMOND on 24 calling today to report she believes she has mastitis in her left breast. Pt stated she is pumping every 4 hours. States she has a red spot that is warm to touch above her nipple on the left breast. Pt also reports pressure and burning in the breast. Reports pain with pumping. States the the left breast has much less output then the right. Denies fevers but reports body aches and headache. Denies breast lumps. Pt stated she had mastitis previously and these were the same symptoms.     Message to DIAMOND (on call) to please advise.

## 2024-10-15 NOTE — TELEPHONE ENCOUNTER
Patient stated she may have mastitis from breast feeding. Wanted to know if medication could be prescribed to [facility] in [place] (on file).

## 2025-01-15 ENCOUNTER — LAB ENCOUNTER (OUTPATIENT)
Dept: LAB | Age: 35
End: 2025-01-15
Attending: STUDENT IN AN ORGANIZED HEALTH CARE EDUCATION/TRAINING PROGRAM
Payer: COMMERCIAL

## 2025-01-15 ENCOUNTER — OFFICE VISIT (OUTPATIENT)
Dept: INTERNAL MEDICINE CLINIC | Facility: CLINIC | Age: 35
End: 2025-01-15

## 2025-01-15 VITALS
BODY MASS INDEX: 38.77 KG/M2 | OXYGEN SATURATION: 97 % | DIASTOLIC BLOOD PRESSURE: 75 MMHG | WEIGHT: 255.81 LBS | SYSTOLIC BLOOD PRESSURE: 119 MMHG | HEART RATE: 76 BPM | HEIGHT: 68 IN

## 2025-01-15 DIAGNOSIS — Z00.00 HEALTH MAINTENANCE EXAMINATION: ICD-10-CM

## 2025-01-15 DIAGNOSIS — L60.0 INGROWN TOENAIL: ICD-10-CM

## 2025-01-15 DIAGNOSIS — B35.1 TOENAIL FUNGUS: Primary | ICD-10-CM

## 2025-01-15 DIAGNOSIS — Z91.89 AT HIGH RISK FOR BREAST CANCER: ICD-10-CM

## 2025-01-15 PROBLEM — E55.9 VITAMIN D DEFICIENCY: Status: ACTIVE | Noted: 2022-08-18

## 2025-01-15 LAB
ALBUMIN SERPL-MCNC: 4.6 G/DL (ref 3.2–4.8)
ALBUMIN/GLOB SERPL: 1.5 {RATIO} (ref 1–2)
ALP LIVER SERPL-CCNC: 113 U/L
ALT SERPL-CCNC: 16 U/L
ANION GAP SERPL CALC-SCNC: 9 MMOL/L (ref 0–18)
AST SERPL-CCNC: 16 U/L (ref ?–34)
BASOPHILS # BLD AUTO: 0.06 X10(3) UL (ref 0–0.2)
BASOPHILS NFR BLD AUTO: 0.6 %
BILIRUB SERPL-MCNC: 0.3 MG/DL (ref 0.3–1.2)
BUN BLD-MCNC: 15 MG/DL (ref 9–23)
BUN/CREAT SERPL: 19.7 (ref 10–20)
CALCIUM BLD-MCNC: 9.4 MG/DL (ref 8.7–10.4)
CHLORIDE SERPL-SCNC: 106 MMOL/L (ref 98–112)
CHOLEST SERPL-MCNC: 232 MG/DL (ref ?–200)
CO2 SERPL-SCNC: 26 MMOL/L (ref 21–32)
CREAT BLD-MCNC: 0.76 MG/DL
DEPRECATED RDW RBC AUTO: 36.5 FL (ref 35.1–46.3)
EGFRCR SERPLBLD CKD-EPI 2021: 107 ML/MIN/1.73M2 (ref 60–?)
EOSINOPHIL # BLD AUTO: 0.17 X10(3) UL (ref 0–0.7)
EOSINOPHIL NFR BLD AUTO: 1.7 %
ERYTHROCYTE [DISTWIDTH] IN BLOOD BY AUTOMATED COUNT: 11.9 % (ref 11–15)
EST. AVERAGE GLUCOSE BLD GHB EST-MCNC: 114 MG/DL (ref 68–126)
FASTING PATIENT LIPID ANSWER: YES
FASTING STATUS PATIENT QL REPORTED: YES
GLOBULIN PLAS-MCNC: 3 G/DL (ref 2–3.5)
GLUCOSE BLD-MCNC: 95 MG/DL (ref 70–99)
HBA1C MFR BLD: 5.6 % (ref ?–5.7)
HCT VFR BLD AUTO: 40.3 %
HDLC SERPL-MCNC: 61 MG/DL (ref 40–59)
HGB BLD-MCNC: 13.3 G/DL
IMM GRANULOCYTES # BLD AUTO: 0.02 X10(3) UL (ref 0–1)
IMM GRANULOCYTES NFR BLD: 0.2 %
LDLC SERPL CALC-MCNC: 145 MG/DL (ref ?–100)
LYMPHOCYTES # BLD AUTO: 3.09 X10(3) UL (ref 1–4)
LYMPHOCYTES NFR BLD AUTO: 31.7 %
MCH RBC QN AUTO: 27.9 PG (ref 26–34)
MCHC RBC AUTO-ENTMCNC: 33 G/DL (ref 31–37)
MCV RBC AUTO: 84.5 FL
MONOCYTES # BLD AUTO: 0.55 X10(3) UL (ref 0.1–1)
MONOCYTES NFR BLD AUTO: 5.6 %
NEUTROPHILS # BLD AUTO: 5.86 X10 (3) UL (ref 1.5–7.7)
NEUTROPHILS # BLD AUTO: 5.86 X10(3) UL (ref 1.5–7.7)
NEUTROPHILS NFR BLD AUTO: 60.2 %
NONHDLC SERPL-MCNC: 171 MG/DL (ref ?–130)
OSMOLALITY SERPL CALC.SUM OF ELEC: 293 MOSM/KG (ref 275–295)
PLATELET # BLD AUTO: 371 10(3)UL (ref 150–450)
POTASSIUM SERPL-SCNC: 4.5 MMOL/L (ref 3.5–5.1)
PROT SERPL-MCNC: 7.6 G/DL (ref 5.7–8.2)
RBC # BLD AUTO: 4.77 X10(6)UL
SODIUM SERPL-SCNC: 141 MMOL/L (ref 136–145)
TRIGL SERPL-MCNC: 145 MG/DL (ref 30–149)
TSI SER-ACNC: 2.41 UIU/ML (ref 0.55–4.78)
VLDLC SERPL CALC-MCNC: 27 MG/DL (ref 0–30)
WBC # BLD AUTO: 9.8 X10(3) UL (ref 4–11)

## 2025-01-15 PROCEDURE — 80053 COMPREHEN METABOLIC PANEL: CPT | Performed by: STUDENT IN AN ORGANIZED HEALTH CARE EDUCATION/TRAINING PROGRAM

## 2025-01-15 PROCEDURE — 80061 LIPID PANEL: CPT | Performed by: STUDENT IN AN ORGANIZED HEALTH CARE EDUCATION/TRAINING PROGRAM

## 2025-01-15 PROCEDURE — 99385 PREV VISIT NEW AGE 18-39: CPT | Performed by: STUDENT IN AN ORGANIZED HEALTH CARE EDUCATION/TRAINING PROGRAM

## 2025-01-15 PROCEDURE — 99203 OFFICE O/P NEW LOW 30 MIN: CPT | Performed by: STUDENT IN AN ORGANIZED HEALTH CARE EDUCATION/TRAINING PROGRAM

## 2025-01-15 PROCEDURE — 83036 HEMOGLOBIN GLYCOSYLATED A1C: CPT | Performed by: STUDENT IN AN ORGANIZED HEALTH CARE EDUCATION/TRAINING PROGRAM

## 2025-01-15 PROCEDURE — 36415 COLL VENOUS BLD VENIPUNCTURE: CPT | Performed by: STUDENT IN AN ORGANIZED HEALTH CARE EDUCATION/TRAINING PROGRAM

## 2025-01-15 PROCEDURE — 85025 COMPLETE CBC W/AUTO DIFF WBC: CPT | Performed by: STUDENT IN AN ORGANIZED HEALTH CARE EDUCATION/TRAINING PROGRAM

## 2025-01-15 PROCEDURE — 84443 ASSAY THYROID STIM HORMONE: CPT | Performed by: STUDENT IN AN ORGANIZED HEALTH CARE EDUCATION/TRAINING PROGRAM

## 2025-01-15 RX ORDER — KETOCONAZOLE 20 MG/G
1 CREAM TOPICAL 2 TIMES DAILY
Qty: 30 G | Refills: 1 | Status: SHIPPED | OUTPATIENT
Start: 2025-01-15

## 2025-01-15 NOTE — ASSESSMENT & PLAN NOTE
Patient's tyrer-Cuzick score puts her at 31.5% risk for breast cancer in her lifetime.   She should follow-up with the high risk breast clinic.

## 2025-01-15 NOTE — PROGRESS NOTES
Subjective     Chief Complaint   Patient presents with    Toenail     New pt. Left foot toe fungus concerns, noticed a month ago, no pain    Ingrown Toenail     Big toe left foot ingrown, very painful, notice some pus     Fungus Nails     Left hand middle finger green color on nail, no pain       Gely Erwin is a 34 year old female who is presenting today to [facility] care and for an annual exam.     Patient has an ingrown nail, first toe, left foot. Very painful.   She has also noticed a discoloration on the nail of the third toe, same foot.     She has also noticed a discoloration on the middle finger of her left hand.     Patient's mother and grandmother both have breast cancer. Mother was negative for BRCA. Patient is concerned about her risk.   Her last pap smear was in 2023.     Patient is still breastfeeding.   Patient denies chest pain, SOB, N/V, hematuria, BRBPR, mood disturbances    Past Medical History:    Essential hypertension    Generalized anxiety disorder    History of psychological trauma    Hyperglycemia    Postpartum anxiety (HCC)    Postpartum depression       Past Surgical History:   Procedure Laterality Date    Ankle fracture surgery Right 1999    Surgical repair of Broken ankle-Right (as per NG)       Allergies[1]    Family History   Problem Relation Age of Onset    Personality Disorder Mother     Breast Cancer Mother         In 2021    Glaucoma Mother     Diabetes Maternal Grandmother     High Blood Pressure Maternal Grandmother     Lipids Maternal Grandmother     Heart Attack Maternal Grandmother 50        (as per NG)    Breast Cancer Maternal Grandmother     Macular degeneration Paternal Grandmother     Allergies Brother         (as per NG)    Cancer Other         MGM sister       Social History     Socioeconomic History    Marital status:    Tobacco Use    Smoking status: Never    Smokeless tobacco: Never   Vaping Use    Vaping status: Never Used   Substance and  Sexual Activity    Alcohol use: Yes     Comment: Socially, 1 beer, last month (as per NG)    Drug use: No   Other Topics Concern    Caffeine Concern Yes     Comment: Coffee, Unknown amount (as per NG)         I have personally reviewed and updated the following EMR sections as appropriate: Current medications, Allergies, Problem list, Past Medical History, Past Surgical History, Social History and Family History    Review of Systems   Constitutional:  Negative for chills and fever.   Respiratory:  Negative for cough and shortness of breath.    Cardiovascular:  Negative for chest pain.   Gastrointestinal:  Negative for abdominal pain and diarrhea.   Endocrine: Negative for polydipsia and polyuria.   Musculoskeletal:  Negative for joint swelling.   Skin:  Negative for rash and wound.   Neurological:  Negative for dizziness and numbness.       Objective     Medications Ordered Prior to Encounter[2]  /75 (BP Location: Right arm, Patient Position: Sitting, Cuff Size: large)   Pulse 76   Ht 5' 8\" (1.727 m)   Wt 255 lb 12.8 oz (116 kg)   LMP 12/22/2024 (Exact Date)   SpO2 97%   BMI 38.89 kg/m²   Physical Exam  Vitals reviewed.   Constitutional:       Appearance: Normal appearance.   HENT:      Head: Normocephalic and atraumatic.   Cardiovascular:      Rate and Rhythm: Normal rate and regular rhythm.   Pulmonary:      Effort: Pulmonary effort is normal.      Breath sounds: Normal breath sounds.   Abdominal:      General: Abdomen is flat. There is no distension.      Palpations: Abdomen is soft.      Tenderness: There is no abdominal tenderness.   Musculoskeletal:      Cervical back: Normal range of motion and neck supple.   Lymphadenopathy:      Cervical: No cervical adenopathy.   Skin:     General: Skin is warm and dry.   Neurological:      General: No focal deficit present.      Mental Status: She is alert and oriented to person, place, and time.   Psychiatric:         Mood and Affect: Mood normal.          Behavior: Behavior normal.         No results found for this or any previous visit (from the past 14 weeks).    Assessment and Plan      Toenail fungus (Primary)  -     Podiatry Referral  Ingrown toenail  -     Podiatry Referral  Health maintenance examination  -     CBC With Differential With Platelet  -     Comp Metabolic Panel (14)  -     Hemoglobin A1C  -     Lipid Panel  -     TSH W Reflex To Free T4  At high risk for breast cancer  Assessment & Plan:  Patient's tyrer-Cuzick score puts her at 31.5% risk for breast cancer in her lifetime.   She should follow-up with the high risk breast clinic.   Orders:  -     High Risk Breast Clinic Referral - [facility]  Other orders  -     Ketoconazole; Apply 1 Application topically 2 (two) times daily.  Dispense: 30 g; Refill: 1      No follow-ups on file.    Danielle Chiu MD  Internal Medicine  1/15/2025       [1]   Allergies  Allergen Reactions    Shellfish-Derived Products OTHER (SEE COMMENTS)   [2]   Current Outpatient Medications on File Prior to Visit   Medication Sig Dispense Refill    sertraline (ZOLOFT) 100 MG Oral Tab Take 1 tablet (100 mg total) by mouth daily. 90 tablet 0    prenatal vitamin with DHA 27-0.8-228 MG Oral Cap Take 1 capsule by mouth daily.      Magnesium 250 MG Oral Cap Take 500 mg by mouth 2 (two) times daily. (Patient not taking: Reported on 1/15/2025)       No current facility-administered medications on file prior to visit.

## 2025-03-18 ENCOUNTER — OFFICE VISIT (OUTPATIENT)
Dept: DERMATOLOGY CLINIC | Facility: CLINIC | Age: 35
End: 2025-03-18

## 2025-03-18 DIAGNOSIS — L81.4 LENTIGINES: Primary | ICD-10-CM

## 2025-03-18 DIAGNOSIS — D22.9 MULTIPLE BENIGN NEVI: ICD-10-CM

## 2025-03-18 DIAGNOSIS — D18.01 CHERRY ANGIOMA: ICD-10-CM

## 2025-03-18 PROCEDURE — 99203 OFFICE O/P NEW LOW 30 MIN: CPT | Performed by: STUDENT IN AN ORGANIZED HEALTH CARE EDUCATION/TRAINING PROGRAM

## 2025-03-18 NOTE — PROGRESS NOTES
New patient     Referred by:   No referring provider defined for this encounter.     CHIEF COMPLAINT: FBSE    HISTORY OF PRESENT ILLNESS: .    1. Lesion  Location: L foot   Duration: Years  Bleeding, growing, changing?: No  Scaly?:No    Itchy?:No    Current treatment: None  Past treatments: None         DERM HISTORY:  History of skin cancer: No  History of melanoma: No    FAMILY HISTORY:  History of melanoma: No    PAST MEDICAL HISTORY:  Past Medical History:    Essential hypertension    Generalized anxiety disorder    History of psychological trauma    Hyperglycemia    Postpartum anxiety (HCC)    Postpartum depression       REVIEW OF SYSTEMS:  Constitutional: Denies fever, chills, unintentional weight loss.   Skin as per HPI    Medications  Current Outpatient Medications   Medication Sig Dispense Refill    lamoTRIgine 25 MG Oral Tab Take 1 tablet (25 mg) by mouth daily for 14 days, then take 2 tablets (50 mg) by mouth for 14 days, then take 4 tablets (100 mg) for 14 days.      busPIRone 10 MG Oral Tab Take 1 tablet (10 mg total) by mouth daily. 90 tablet 0    sertraline (ZOLOFT) 100 MG Oral Tab Take 1 tablet (100 mg total) by mouth nightly. 90 tablet 0    LORazepam 0.5 MG Oral Tab Take 0.5-1 tablets (0.25-0.5 mg total) by mouth 2 (two) times daily as needed for Anxiety (panic). 90 tablet 0    ketoconazole 2 % External Cream Apply 1 Application topically 2 (two) times daily. 30 g 1    Magnesium 250 MG Oral Cap Take 500 mg by mouth 2 (two) times daily. (Patient not taking: Reported on 1/15/2025)      prenatal vitamin with DHA 27-0.8-228 MG Oral Cap Take 1 capsule by mouth daily.         PHYSICAL EXAM:  Physician accompanied by chaperone during examination   General: awake, alert, no acute distress  Skin: Skin exam was performed today including the following: head and face, scalp, neck, chest (including breasts and axillae), abdomen, back, bilateral upper extremities, bilateral lower extremities, hands, feet, digits,  nails. Pertinent findings include:   - Scattered bright red-purple dome-shaped papules on the trunk and extremities   - Scattered light brown stellate macules on sun exposed sites  - Scattered, evenly colored, round brown macules and papules with regular borders on the trunk and extremities  - L planter foot with a regular brown macule    ASSESSMENT & PLAN:  Pathophysiology of diagnoses discussed with patient.  Therapeutic options reviewed. Risks, benefits, and alternatives discussed with patient. Instructions reviewed at length.    #Lentigines  #Cherry angiomas   - Reassurance provided regarding the benign nature of these lesions.    #Multiple benign nevi  - Complete skin exam performed today with no outlier lesions identified   - Reassured patient of benign nature of these lesions.   - Recommend daily photoprotection with broad-spectrum sunscreen, avoidance of sun during peak hours, and sun protective clothing.    - Dermoscopy was used for physical examination of pigmented lesions during today's office visit.      Return to clinic: 2-3 years  or sooner if something concerning arises     Yovani Freitas MD    By signing my name below, I, Michael GUO MA,  attest that this documentation has been prepared under the direction and in the presence of Yovani Freitas MD.   Electronically Signed: Michael GUO MA, 3/18/2025, 11:47 AM.    I, Yovani Freitas MD,  personally performed the services described in this documentation. All medical record entries made by the scribe were at my direction and in my presence.  I have reviewed the chart and agree that the record reflects my personal performance and is accurate and complete.  Yovani Freitas MD, 3/18/2025, 12:54 PM

## 2025-05-10 ENCOUNTER — OFFICE VISIT (OUTPATIENT)
Dept: FAMILY MEDICINE CLINIC | Facility: CLINIC | Age: 35
End: 2025-05-10
Payer: COMMERCIAL

## 2025-05-10 VITALS
OXYGEN SATURATION: 99 % | WEIGHT: 252 LBS | RESPIRATION RATE: 18 BRPM | BODY MASS INDEX: 38.19 KG/M2 | SYSTOLIC BLOOD PRESSURE: 132 MMHG | HEIGHT: 68 IN | HEART RATE: 88 BPM | DIASTOLIC BLOOD PRESSURE: 82 MMHG | TEMPERATURE: 98 F

## 2025-05-10 DIAGNOSIS — B07.8 COMMON WART: Primary | ICD-10-CM

## 2025-05-10 PROCEDURE — 99213 OFFICE O/P EST LOW 20 MIN: CPT | Performed by: NURSE PRACTITIONER

## 2025-05-10 NOTE — PROGRESS NOTES
CHIEF COMPLAINT:     Chief Complaint   Patient presents with    Foot Pain     Month w/ (Left) under foot painful and hard to walk       HPI:   Gely Erwin is a 34 year old female who presents for evaluation of a skin lesion . Patient states lesion was first noticed 1 month ago and has been painful since onset. Patient never had similar lesion in the past. It is  characterized by raised skin colored annular lesion with dark center. The affected locations include left foot near outer edge. Patient has not treated this lesion. Associated symptoms include pain with walking . Denies sick exposure, Denies travel. Denies cough, eye pain, edema, fatigue, joint pain, SOB, CP or abdominal pain.      Current Medications[1]   Past Medical History[2]   Past Surgical History[3]   Family History[4]   Short Social Hx on File[5]      REVIEW OF SYSTEMS:   GENERAL:  usual appetite  SKIN: see HPI  HEENT: denies nasal congestion, sore throat    LUNGS: denies shortness of breath or wheezing  CARDIOVASCULAR: denies chest pain or palpitations   GI: denies N/V/C or abdominal pain  NEURO:denies headache    EXAM:   /82   Pulse 88   Temp 97.8 °F (36.6 °C)   Resp 18   Ht 5' 8\" (1.727 m)   Wt 252 lb (114.3 kg)   LMP 03/05/2025 (Exact Date)   SpO2 99%   Breastfeeding Yes   BMI 38.32 kg/m²   GENERAL: A&O x 3, no acute distress  SKIN: 10 mm annular skin colored scaly/rough raised lesion with dark center  NECK: supple, non-tender  LUNGS: non labored breathing,  clear to auscultation bilaterally, no wheezing, rales or rhonchi  CARDIO: RRR without murmur  EXTREMITIES: no cyanosis, clubbing or edema        ASSESSMENT:   Gely Erwin is a 34 year old female who presented for evaluation of a dermatological issue. Symptoms are consistent with:  Common wart.   Encounter Diagnosis   Name Primary?    Common wart Yes         PLAN   Skin care discussed with the patient. Given resource for Dermatology and information  for OTC wart removal products.   Risks, benefits, and side effects of medication explained and discussed. Medications as listed below.        Patient instructions handout given to patient prior to departure.   Follow up prn or with PCP if symptoms worsen or persist within 2-3 days as discussed.  Patient understands and agrees with plan.   Staci Marks, APRN  5/10/2025  3:10 PM           [1]   Current Outpatient Medications   Medication Sig Dispense Refill    buPROPion  MG Oral Tablet 24 Hr Take 1 tablet (150 mg total) by mouth in the morning. 30 tablet 1    sertraline (ZOLOFT) 50 MG Oral Tab Take 1 tablet (50 mg total) by mouth nightly. 90 tablet 0    busPIRone HCl 30 MG Oral Tab Take 0.5 tablets (15 mg total) by mouth daily.      LORazepam 0.5 MG Oral Tab Take 0.5-1 tablets (0.25-0.5 mg total) by mouth 2 (two) times daily as needed for Anxiety (panic). 90 tablet 0    ketoconazole 2 % External Cream Apply 1 Application topically 2 (two) times daily. (Patient not taking: Reported on 5/10/2025) 30 g 1    Magnesium 250 MG Oral Cap Take 500 mg by mouth 2 (two) times daily. (Patient not taking: Reported on 5/10/2025)      prenatal vitamin with DHA 27-0.8-228 MG Oral Cap Take 1 capsule by mouth daily. (Patient not taking: Reported on 5/10/2025)     [2]   Past Medical History:   Essential hypertension    Generalized anxiety disorder    History of psychological trauma    Hyperglycemia    Postpartum anxiety (HCC)    Postpartum depression   [3]   Past Surgical History:  Procedure Laterality Date    Ankle fracture surgery Right 1999    Surgical repair of Broken ankle-Right (as per NG)   [4]   Family History  Problem Relation Age of Onset    Personality Disorder Mother     Breast Cancer Mother         In 2021    Glaucoma Mother     Diabetes Maternal Grandmother     High Blood Pressure Maternal Grandmother     Lipids Maternal Grandmother     Heart Attack Maternal Grandmother 50        (as per NG)    Breast Cancer  Maternal Grandmother     Macular degeneration Paternal Grandmother     Allergies Brother         (as per NG)    Cancer Other         MGM sister   [5]   Social History  Socioeconomic History    Marital status:    Tobacco Use    Smoking status: Never    Smokeless tobacco: Never   Vaping Use    Vaping status: Never Used   Substance and Sexual Activity    Alcohol use: Yes     Comment: Socially, 1 beer, last month (as per NG)    Drug use: No   Other Topics Concern    Caffeine Concern Yes     Comment: Coffee, Unknown amount (as per NG)    Reaction to local anesthetic No     Social Drivers of Health     Food Insecurity: No Food Insecurity (8/6/2024)    Food Insecurity     Food Insecurity: Never true   Transportation Needs: No Transportation Needs (8/6/2024)    Transportation Needs     Lack of Transportation: No     Car Seat: Yes   Stress: No Stress Concern Present (8/6/2024)    Stress     Feeling of Stress : No   Housing Stability: Medium Risk (8/6/2024)    Housing Stability     Housing Instability: No     Crib or Bassinette: Yes

## 2025-05-28 ENCOUNTER — OFFICE VISIT (OUTPATIENT)
Dept: PODIATRY CLINIC | Facility: CLINIC | Age: 35
End: 2025-05-28

## 2025-05-28 DIAGNOSIS — M79.9 SOFT TISSUE LESION OF FOOT: Primary | ICD-10-CM

## 2025-05-28 DIAGNOSIS — L60.0 INGROWN TOENAIL OF BOTH FEET: ICD-10-CM

## 2025-05-28 PROCEDURE — 99204 OFFICE O/P NEW MOD 45 MIN: CPT | Performed by: PODIATRIST

## 2025-05-28 RX ORDER — CEFADROXIL 500 MG/1
500 CAPSULE ORAL 2 TIMES DAILY
Qty: 30 CAPSULE | Refills: 0 | Status: SHIPPED | OUTPATIENT
Start: 2025-05-28

## 2025-05-28 NOTE — PROGRESS NOTES
Reason for Visit      Gely Erwin is a 34 year old female presents today complaining of bilateral ingrown toenails.     History of Present Illness     Patient presents to clinic today with multiple issues in regards to her bilateral lower extremities.  Patient had ingrown toenail procedures done on bilateral borders of bilateral hallucis in January and the nails grew back and painful.  Patient is interested in a more permanent procedure.  Patient also has soft tissue lesion subfifth metatarsal head of the left foot which she has been told was a wart which she has been using topical medication.    The following portions of the patient's history were reviewed and updated as appropriate: allergies, current medications, past family history, past medical history, past social history, past surgical history and problem list.    Allergies[1]    Medications - Current[2]    There are no discontinued medications.    Problem List[3]    Past Medical History[4]    Past Surgical History[5]    Family History[6]    Social History     Occupational History    Not on file   Tobacco Use    Smoking status: Never    Smokeless tobacco: Never   Vaping Use    Vaping status: Never Used   Substance and Sexual Activity    Alcohol use: Yes     Comment: Socially, 1 beer, last month (as per NG)    Drug use: No    Sexual activity: Not on file       ROS      Constitutional: negative for chills, fevers and sweats  Gastrointestinal: negative for abdominal pain, diarrhea, nausea and vomiting  Genitourinary:negative for dysuria and hematuria  Musculoskeletal:negative for arthralgias and muscle weakness  Neurological: negative for paresthesia and weakness  All others reviewed and negative.      Physical Exam     LE PHYSICAL EXAM  Constitution: Well-developed and well-nourished. Gait appears normal. No apparent distress. Alert and oriented to person, place, and time.  Integument: There are no varicosities. Skin appears moist, warm, and  supple with positive hair growth. There are no color changes. No open lesions. No macerations, No Hyperkeratotic lesions.  Vascular examination: Dorsalis pedis and posterior tibial pulses are strong bilaterally with capillary filling time less than 3 seconds to all digits. There is no peripheral edema..  Neurological Sensorium: Grossly intact to sharp/dull. Vibratory: Intact.  Musculoskeletal:   5/5 pedal muscle strength b/l     Incurvated medial lateral nail borders of bilateral halluces.  No edema and erythema noted to bilateral borders of bilateral halluces.  No ascending cellulitis noted  Hyperkeratotic lesion subfifth metatarsal head of the left foot.    Vitals: [facility] 03/05/2025 (Exact Date)       Assessment and Plan     Encounter Diagnoses   Name Primary?    Soft tissue lesion of foot Yes    Ingrown toenail of both feet    A lengthy discussion was had in regards to both conservative and surgical treatment. Discussed risk of infection, both soft tissue and bone if not treated appropriately. Discussed signs of infection and to call the office immediately with any concerns.  -Patient elected for conservative treatment at this time. Soaking instructions were dispensed to the patient.    - Patient has had partial nail avulsions with phenol in the past and they grew back.  Patient is interested in a more permanent suture discussed monograph procedure in great detail.  - In regards to the soft tissue lesion subfifth metatarsal head of the left foot performed a biopsy at today's office visit.  Discussed differential diagnosis of plantar verruca versus hyperkeratotic lesion    Patient was instructed to call the office or on-call podiatric physician immediately with any issues or concerns before the next scheduled visit. Patient to follow-up in clinic in as needed      Yuni Hernandez DPM, D.JAMEY, ALL  Diplomat, American Board of Foot and Ankle Surgery  Certified in Foot and Rearfoot/Ankle Reconstruction  Fellow of  the American College of Foot and Ankle Surgeons  Fellowship Trained Foot and Ankle Surgeon   [facility]     5/28/2025    7:18 AM         [1]   Allergies  Allergen Reactions    Lamotrigine HIVES and ITCHING   [2]   Current Outpatient Medications:     buPROPion  MG Oral Tablet 24 Hr, Take 1 tablet (150 mg total) by mouth in the morning., Disp: 30 tablet, Rfl: 1    sertraline (ZOLOFT) 50 MG Oral Tab, Take 1 tablet (50 mg total) by mouth nightly. (Patient taking differently: Take 0.5 tablets (25 mg total) by mouth nightly.), Disp: 90 tablet, Rfl: 0    busPIRone HCl 30 MG Oral Tab, Take 0.5 tablets (15 mg total) by mouth daily., Disp: , Rfl:     LORazepam 0.5 MG Oral Tab, Take 0.5-1 tablets (0.25-0.5 mg total) by mouth 2 (two) times daily as needed for Anxiety (panic). (Patient taking differently: Take 2-4 tablets (1-2 mg total) by mouth as needed in the morning and 2-4 tablets (1-2 mg total) as needed in the evening for Anxiety (panic).), Disp: 90 tablet, Rfl: 0    ketoconazole 2 % External Cream, Apply 1 Application topically 2 (two) times daily. (Patient not taking: Reported on 5/10/2025), Disp: 30 g, Rfl: 1    Magnesium 250 MG Oral Cap, Take 500 mg by mouth 2 (two) times daily. (Patient not taking: Reported on 5/10/2025), Disp: , Rfl:     prenatal vitamin with DHA 27-0.8-228 MG Oral Cap, Take 1 capsule by mouth daily. (Patient not taking: Reported on 5/10/2025), Disp: , Rfl:   [3]   Patient Active Problem List  Diagnosis    Anxiety    History of psychological trauma    Generalized anxiety disorder    Postpartum anxiety (HCC)    Postpartum depression    Vitamin D deficiency    At high risk for breast cancer   [4]   Past Medical History:   Essential hypertension    Generalized anxiety disorder    History of psychological trauma    Hyperglycemia    Postpartum anxiety (HCC)    Postpartum depression   [5]   Past Surgical History:  Procedure Laterality Date    Ankle fracture surgery Right 1999     Surgical repair of Broken ankle-Right (as per NG)   [6]   Family History  Problem Relation Age of Onset    Personality Disorder Mother     Breast Cancer Mother         In 2021    Glaucoma Mother     Diabetes Maternal Grandmother     High Blood Pressure Maternal Grandmother     Lipids Maternal Grandmother     Heart Attack Maternal Grandmother 50        (as per NG)    Breast Cancer Maternal Grandmother     Macular degeneration Paternal Grandmother     Allergies Brother         (as per NG)    Cancer Other         MGM sister

## 2025-06-04 ENCOUNTER — PATIENT MESSAGE (OUTPATIENT)
Dept: PODIATRY CLINIC | Facility: CLINIC | Age: 35
End: 2025-06-04

## 2025-06-04 ENCOUNTER — PATIENT MESSAGE (OUTPATIENT)
Dept: INTERNAL MEDICINE CLINIC | Facility: CLINIC | Age: 35
End: 2025-06-04

## 2025-06-05 ENCOUNTER — TELEPHONE (OUTPATIENT)
Dept: PODIATRY CLINIC | Facility: CLINIC | Age: 35
End: 2025-06-05

## 2025-06-05 NOTE — TELEPHONE ENCOUNTER
S/w patient and informed her of the results. Provided recommendations to help calluses. She was agreeable to plan.

## 2025-06-05 NOTE — TELEPHONE ENCOUNTER
----- Message from Yuni Ying sent at 6/4/2025  6:44 PM CDT -----  Avoiding going barefoot, applying cushion to the area, applying lotion.  Calluses are bodies result of extra pressure in the area.  Anything more in-depth would require a follow-up appointment  ----- Message -----  From: Jasmyne Mac RN  Sent: 6/4/2025   9:55 AM CDT  To: Yuni Ying DPM    LOV 5/28. Please advise on recommended treatments for plantar callus and we will call the pt with biopsy results. Thank you.  ----- Message -----  From: Yuni Ying DPM  Sent: 5/30/2025   6:46 AM CDT  To:  Podiatry Clinical Staff    Please call patient and inform her that her biopsy was negative for any sort of wart just a callus at this time.  ----- Message -----  From: Lab, Background User  Sent: 5/29/2025   2:35 PM CDT  To: Yuni Ying DPM

## 2025-06-06 ENCOUNTER — TELEPHONE (OUTPATIENT)
Dept: PODIATRY CLINIC | Facility: CLINIC | Age: 35
End: 2025-06-06

## 2025-06-06 NOTE — TELEPHONE ENCOUNTER
Addressed in 6/5 [employer] message.    ----- Message from Yuni Ying sent at 6/4/2025  6:44 PM CDT -----  Avoiding going barefoot, applying cushion to the area, applying lotion.  Calluses are bodies result of extra pressure in the area.  Anything more in-depth would require a follow-up appointment  ----- Message -----  From: Jasmyne Mac RN  Sent: 6/4/2025   9:55 AM CDT  To: Yuni Ying DPM    LOV 5/28. Please advise on recommended treatments for plantar callus and we will call the pt with biopsy results. Thank you.  ----- Message -----  From: Yuni Ying DPM  Sent: 5/30/2025   6:46 AM CDT  To:  Podiatry Clinical Staff    Please call patient and inform her that her biopsy was negative for any sort of wart just a callus at this time.  ----- Message -----  From: Lab, Background User  Sent: 5/29/2025   2:35 PM CDT  To: Yuni Ying DPM

## 2025-06-26 ENCOUNTER — OFFICE VISIT (OUTPATIENT)
Facility: LOCATION | Age: 35
End: 2025-06-26

## 2025-06-26 VITALS
HEIGHT: 68 IN | HEART RATE: 79 BPM | DIASTOLIC BLOOD PRESSURE: 74 MMHG | OXYGEN SATURATION: 98 % | BODY MASS INDEX: 38.37 KG/M2 | WEIGHT: 253.19 LBS | SYSTOLIC BLOOD PRESSURE: 111 MMHG | RESPIRATION RATE: 18 BRPM

## 2025-06-26 DIAGNOSIS — Z87.19 HISTORY OF GALLSTONES: ICD-10-CM

## 2025-06-26 DIAGNOSIS — Z84.1 FAMILY HISTORY OF KIDNEY STONES: ICD-10-CM

## 2025-06-26 DIAGNOSIS — R35.0 URINE FREQUENCY: Primary | ICD-10-CM

## 2025-06-26 DIAGNOSIS — R10.11 RIGHT UPPER QUADRANT ABDOMINAL PAIN: ICD-10-CM

## 2025-06-26 LAB
BILIRUBIN: NEGATIVE
GLUCOSE (URINE DIPSTICK): NEGATIVE MG/DL
KETONES (URINE DIPSTICK): NEGATIVE MG/DL
LEUKOCYTES: NEGATIVE
MULTISTIX LOT#: ABNORMAL NUMERIC
NITRITE, URINE: NEGATIVE
PH, URINE: 5.5 (ref 4.5–8)
PROTEIN (URINE DIPSTICK): NEGATIVE MG/DL
SPECIFIC GRAVITY: 1.01 (ref 1–1.03)
URINE-COLOR: YELLOW
UROBILINOGEN,SEMI-QN: 0.2 MG/DL (ref 0–1.9)

## 2025-06-26 PROCEDURE — 99213 OFFICE O/P EST LOW 20 MIN: CPT | Performed by: NURSE PRACTITIONER

## 2025-06-26 PROCEDURE — 81002 URINALYSIS NONAUTO W/O SCOPE: CPT | Performed by: NURSE PRACTITIONER

## 2025-06-26 NOTE — PATIENT INSTRUCTIONS
VISIT SUMMARY:  Today, you were seen for right-sided abdominal pain that has been bothering you, especially after meals. We discussed your history of gallstones and the possibility of kidney stones given your symptoms and family history.    YOUR PLAN:  -GALLSTONES: Gallstones are hard deposits that form in your gallbladder, causing pain, especially after eating large meals. We will order an ultrasound to check your gallbladder and refer you to surgery for gallbladder removal. If you experience fever, painful urination, blood in your urine, worsening abdominal pain, nausea, or vomiting, please seek emergency care.    -POSSIBLE KIDNEY STONE: Kidney stones are hard mineral deposits that can form in your kidneys and cause pain. Given your symptoms and family history, we will send your urine for a culture to check for infection. If you notice changes in your pain, develop a fever, or have other symptoms of kidney stones, please seek emergency care.    INSTRUCTIONS:  Please follow up with the ultrasound and surgical consultation as discussed. Additionally, if you experience any of the emergency symptoms mentioned, seek immediate medical attention.    Contains text generated by Iliana

## 2025-06-26 NOTE — PROGRESS NOTES
OFFICE NOTE       The following individual(s) verbally consented to be recorded using ambient AI listening technology and understand that they can each withdraw their consent to this listening technology at any point by asking the clinician to turn off or pause the recording:    Patient name: Gely Erwin  Additional names:  none       Patient ID: Gely Erwin is a 35 year old female.  Today's Date: 06/26/25  Chief Complaint: Pain (Right abdominal pain/Started 2 days ago/Possible gall stones )    HPI  History of Present Illness  Mrs. Gely Erwin is a 35 year old female with gallstones who presents with right-sided abdominal pain.    She experiences dull, intermittent right-sided abdominal pain that radiates to the back, exacerbated by lifting or moving objects. The pain began after a large meal, leading her to avoid eating due to fear of exacerbation. Initially dull, the pain becomes more intense before settling back to a dull ache. She occasionally massages the area to alleviate discomfort.    She notes increased urination frequency but denies fever, vomiting, or blood in the urine. Occasional chills are present, but no significant fever. There is no history of kidney stones, although her mother and brother have had them.    Her past medical history includes gallstones, with a postponed gallbladder removal initially scheduled for December 2023 due to pregnancy. She has not pursued the surgery since giving birth in August 2024.       Vitals:    06/26/25 1353   BP: 111/74   Pulse: 79   Resp: 18   SpO2: 98%   Weight: 253 lb 3.2 oz (114.9 kg)   Height: 5' 8\" (1.727 m)     body mass index is 38.5 kg/m².  BP Readings from Last 3 Encounters:   06/26/25 111/74   05/10/25 132/82   01/15/25 119/75     The ASCVD Risk score (Zeenat DK, et al., 2019) failed to calculate for the following reasons:    The 2019 ASCVD risk score is only valid for ages 40 to  79  Results  LABS  Urinalysis: Hematuria, no leukocytes (06/26/2025)       Medications reviewed:  Current Medications[1]      Assessment & Plan    1. Urine frequency (Primary)  -     POC Urinalysis, Manual Dip without microscopy [[id number]]  -     Urine Culture, Routine; Future; Expected date: 06/26/2025  -     Urine Culture, Routine  2. Right upper quadrant abdominal pain  -     Surgery Referral - In [facility] GALLBLADDER (CPT=76705); Future; Expected date: 06/26/2025  3. History of gallstones  -     Surgery Referral - In [facility] GALLBLADDER (CPT=[id number]); Future; Expected date: 06/26/2025  4. Family history of kidney stones    Assessment & Plan  Gallstones  Chronic gallstones with intermittent pain, exacerbated by large meals. Previous imaging confirmed gallstones. Current symptoms suggest gallbladder involvement.  - Order ultrasound of the gallbladder.  - Refer to surgery for gallbladder removal.  - Advised emergency care for fever, dysuria, hematuria, abdominal pain, nausea, or vomiting.    Possible kidney stone  Possible kidney stone due to flank pain and hematuria. No infection signs in urine. Family history of kidney stones.  - Send urine for culture.  - Advised emergency care for changes in pain location, fever, or symptoms of kidney stones.       Follow Up: As needed/if symptoms worsen or Return for AS NEEDED/IF SYMPTOMS WORSEN IN THE ER, FOLLOW UP WITH SURGERY..     I spent 20 minutes obtaining pertitent medical history, reviewing pertinent imaging/labs and specialists notes, evaluating patient, discussing differential diagnosis' and various treatment options, reinforcing importance of compliance with treatment plan, and completing documentation.       Objective/ Results:   Physical Exam   Physical Exam  GENERAL: Alert, cooperative, well developed, no acute distress  HEENT: Normocephalic, normal oropharynx, moist mucous membranes  CHEST: Clear to auscultation bilaterally, no wheezes, rhonchi,  or crackles  CARDIOVASCULAR: Normal heart rate and rhythm, S1 and S2 normal without murmurs  ABDOMEN: Soft, non-tender except for minimal tenderness in the epigastric area, non-distended, without organomegaly, normal bowel sounds. No CVA tenderness  EXTREMITIES: No cyanosis or edema  NEUROLOGICAL: Cranial nerves grossly intact, moves all extremities without gross motor or sensory deficit     Reviewed:    Patient Active Problem List    Diagnosis    At high risk for breast cancer    History of psychological trauma    Generalized anxiety disorder    Postpartum anxiety (HCC)    Postpartum depression    Anxiety    Vitamin D deficiency      Allergies[2]   Short Social Hx on File[3]   Review of Systems    All other systems negative unless otherwise stated in ROS or HPI above.       JACOB Woodruff  Internal Medicine       Call office with any questions or seek emergency care if necessary.   Patient understands and agrees to follow directions and advice.      ----------------------------------------- PATIENT INSTRUCTIONS-----------------------------------------     Patient Instructions   VISIT SUMMARY:  Today, you were seen for right-sided abdominal pain that has been bothering you, especially after meals. We discussed your history of gallstones and the possibility of kidney stones given your symptoms and family history.    YOUR PLAN:  -GALLSTONES: Gallstones are hard deposits that form in your gallbladder, causing pain, especially after eating large meals. We will order an ultrasound to check your gallbladder and refer you to surgery for gallbladder removal. If you experience fever, painful urination, blood in your urine, worsening abdominal pain, nausea, or vomiting, please seek emergency care.    -POSSIBLE KIDNEY STONE: Kidney stones are hard mineral deposits that can form in your kidneys and cause pain. Given your symptoms and family history, we will send your urine for a culture to check for infection. If you notice  changes in your pain, develop a fever, or have other symptoms of kidney stones, please seek emergency care.    INSTRUCTIONS:  Please follow up with the ultrasound and surgical consultation as discussed. Additionally, if you experience any of the emergency symptoms mentioned, seek immediate medical attention.    Contains text generated by Iliana          The 21st Century Cures Act makes medical notes available to patients in the interest of transparency.  However, please be advised that this is a medical document.  It is intended as a peer to peer communication.  It is written in medical language and may contain abbreviations or verbiage that are technical and unfamiliar.  It may appear blunt or direct.  Medical documents are intended to carry relevant information, facts as evident, and the clinical opinion of the practitioner.            [1]   Current Outpatient Medications   Medication Sig Dispense Refill    PARoxetine (PAXIL) 20 MG Oral Tab Take 1 tablet (20 mg total) by mouth at bedtime. 90 tablet 0    buPROPion  MG Oral Tablet 24 Hr Take 1 tablet (150 mg total) by mouth in the morning. 30 tablet 1    cefadroxil 500 MG Oral Cap Take 1 capsule (500 mg total) by mouth 2 (two) times daily. (Patient not taking: Reported on 6/26/2025) 30 capsule 0    LORazepam 0.5 MG Oral Tab Take 0.5-1 tablets (0.25-0.5 mg total) by mouth 2 (two) times daily as needed for Anxiety (panic). (Patient taking differently: Take 2-4 tablets (1-2 mg total) by mouth as needed in the morning and 2-4 tablets (1-2 mg total) as needed in the evening for Anxiety (panic).) 90 tablet 0    ketoconazole 2 % External Cream Apply 1 Application topically 2 (two) times daily. (Patient not taking: Reported on 5/10/2025) 30 g 1    Magnesium 250 MG Oral Cap Take 500 mg by mouth 2 (two) times daily. (Patient not taking: Reported on 5/10/2025)      prenatal vitamin with DHA 27-0.8-228 MG Oral Cap Take 1 capsule by mouth daily. (Patient not taking:  Reported on 5/10/2025)     [2]   Allergies  Allergen Reactions    Lamotrigine HIVES and ITCHING   [3]   Social History  Socioeconomic History    Marital status:    Tobacco Use    Smoking status: Never    Smokeless tobacco: Never   Vaping Use    Vaping status: Never Used   Substance and Sexual Activity    Alcohol use: Not Currently     Comment: Socially, 1 beer, last month (as per NG)    Drug use: No   Other Topics Concern    Caffeine Concern Yes     Comment: Coffee, Unknown amount (as per NG)    Reaction to local anesthetic No     Social Drivers of Health     Food Insecurity: No Food Insecurity (6/26/2025)    NCSS - Food Insecurity     Worried About Running Out of Food in the Last Year: No     Ran Out of Food in the Last Year: No   Transportation Needs: No Transportation Needs (6/26/2025)    NCSS - Transportation     Lack of Transportation: No   Stress: No Stress Concern Present (8/6/2024)    Stress     Feeling of Stress : No   Housing Stability: Not At Risk (6/26/2025)    NCSS - Housing/Utilities     Has Housing: Yes     Worried About Losing Housing: No     Unable to Get Utilities: No

## 2025-07-03 ENCOUNTER — OFFICE VISIT (OUTPATIENT)
Dept: SURGERY | Facility: CLINIC | Age: 35
End: 2025-07-03

## 2025-07-03 VITALS
SYSTOLIC BLOOD PRESSURE: 117 MMHG | WEIGHT: 253 LBS | BODY MASS INDEX: 38.34 KG/M2 | HEIGHT: 68 IN | HEART RATE: 71 BPM | DIASTOLIC BLOOD PRESSURE: 70 MMHG

## 2025-07-03 DIAGNOSIS — R10.11 RUQ PAIN: Primary | ICD-10-CM

## 2025-07-03 DIAGNOSIS — K80.20 GALLSTONES: ICD-10-CM

## 2025-07-03 PROCEDURE — 99204 OFFICE O/P NEW MOD 45 MIN: CPT | Performed by: SURGERY

## 2025-07-03 NOTE — H&P
Chief complaint: RUQ pain, gallstones    HPI: Gely  presents for consult for gallstones and worsening abd pain, RUQ, radiates around costal margin. The pain is daily, randomly occurring and often postprandial. No h/o J/F/C. No h/o pancreatitis. No h/o PUD. She has no recent imaging, an US has been ordered. We reviewed her US done at [facility] in 2023, gallstones without cholecystitis. She had been scheduled for lap paul with Dr. Leiva, but then she got pregnant and surgery was postponed.     Past medical history: Past Medical History[1]    Past surgical history: Past Surgical History[2]    Allergies: Allergies[3]    Medications: Current Medications[4]    Social history:   Set as collapsible by default.[5]     Family history:  Family History[6]     Review of Systems:   GENERAL: feels generally well  SKIN: no ulcerated or worrisome skin lesions  EYES:denies blurred vision or double vision  HEENT: denies new nasal congestion, sinus pain or ST  LUNGS: denies shortness of breath with exertion  CARDIOVASCULAR: denies chest pain on exertion  GI: no hematemesis, no BRBPR, no worsening heartburn  : no dysuria, no blood in urine, no difficulty urinating  MUSCULOSKELETAL: no new musculoskeletal complaints  NEURO: no persistent, recurrent  headaches  PSYCHE:no depression or anxiety  HEMATOLOGIC: no hx of blood dyscrasia  ENDOCRINE: no new endocrine problems  ALL/ASTHMA: no new hx of severe allergy or asthma  BACK: normal, no spinal deformity, no CVA tenderness    Physical examination:     Constitutional: appears well hydrated alert and responsive no acute distress noted  HEENT wnl, anicteric, PERRL, normocephalic, atraumatic  Neck supple, norm ROM, no JVD  L CTA B  H Reg rate  Abd soft, NT, ND, no masses, no hernias, no HSM.  Extr no c/c/e  Skin intact, no jaundice, no rashes, no lesions  Neuro grossly intact, no focal deficits, no tremors  Back no deformity, no CVA tnd.         Assessment and plan:  Diagnoses and all orders  for this visit:    RUQ pain    Gallstones         Plan lap paul, possible open, possible cholangiogram  We have discussed the surgical risks, benefits, alternatives, and expected recovery. All of the patient's questions have been answered to her satisfaction.  Low fat small meals, good hydration discussed. S//s of concern discussed as well.     Thank you     Suzie Bermudez MD  7/3/2025  11:20 AM         [1]   Past Medical History:   Essential hypertension    Generalized anxiety disorder    History of psychological trauma    Hyperglycemia    Postpartum anxiety (HCC)    Postpartum depression   [2]   Past Surgical History:  Procedure Laterality Date    Ankle fracture surgery Right 1999    Surgical repair of Broken ankle-Right (as per NG)   [3]   Allergies  Allergen Reactions    Lamotrigine HIVES and ITCHING   [4]   Current Outpatient Medications   Medication Sig Dispense Refill    buPROPion  MG Oral Tablet 24 Hr TAKE 1 TABLET (150 MG TOTAL) BY MOUTH IN THE MORNING 30 tablet 0    PARoxetine (PAXIL) 20 MG Oral Tab Take 1 tablet (20 mg total) by mouth at bedtime. 90 tablet 0    cefadroxil 500 MG Oral Cap Take 1 capsule (500 mg total) by mouth 2 (two) times daily. (Patient not taking: Reported on 6/26/2025) 30 capsule 0    LORazepam 0.5 MG Oral Tab Take 0.5-1 tablets (0.25-0.5 mg total) by mouth 2 (two) times daily as needed for Anxiety (panic). (Patient taking differently: Take 2-4 tablets (1-2 mg total) by mouth as needed in the morning and 2-4 tablets (1-2 mg total) as needed in the evening for Anxiety (panic).) 90 tablet 0    ketoconazole 2 % External Cream Apply 1 Application topically 2 (two) times daily. (Patient not taking: Reported on 5/10/2025) 30 g 1    Magnesium 250 MG Oral Cap Take 500 mg by mouth 2 (two) times daily. (Patient not taking: Reported on 5/10/2025)      prenatal vitamin with DHA 27-0.8-228 MG Oral Cap Take 1 capsule by mouth daily. (Patient not taking: Reported on 5/10/2025)     [5]    Social History  Socioeconomic History    Marital status:    Tobacco Use    Smoking status: Never    Smokeless tobacco: Never   Vaping Use    Vaping status: Never Used   Substance and Sexual Activity    Alcohol use: Not Currently     Comment: Socially, 1 beer, last month (as per NG)    Drug use: No   [6]   Family History  Problem Relation Age of Onset    Personality Disorder Mother     Breast Cancer Mother         In 2021    Glaucoma Mother     Diabetes Maternal Grandmother     High Blood Pressure Maternal Grandmother     Lipids Maternal Grandmother     Heart Attack Maternal Grandmother 50        (as per NG)    Breast Cancer Maternal Grandmother     Macular degeneration Paternal Grandmother     Allergies Brother         (as per NG)    Cancer Other         MGM sister

## 2025-07-07 ENCOUNTER — HOSPITAL ENCOUNTER (OUTPATIENT)
Dept: ULTRASOUND IMAGING | Age: 35
Discharge: HOME | End: 2025-07-07
Attending: NURSE PRACTITIONER
Payer: COMMERCIAL

## 2025-07-07 ENCOUNTER — TELEPHONE (OUTPATIENT)
Dept: SURGERY | Facility: CLINIC | Age: 35
End: 2025-07-07

## 2025-07-07 DIAGNOSIS — R10.11 RIGHT UPPER QUADRANT ABDOMINAL PAIN: ICD-10-CM

## 2025-07-07 DIAGNOSIS — Z87.19 HISTORY OF GALLSTONES: ICD-10-CM

## 2025-07-07 PROCEDURE — 76705 ECHO EXAM OF ABDOMEN: CPT | Performed by: RADIOLOGY

## 2025-07-07 NOTE — TELEPHONE ENCOUNTER
Per patient asking if it is possible to reschedule her procedure on 7/29/2025 to 7/15/2025. Please call back.

## 2025-07-08 NOTE — TELEPHONE ENCOUNTER
Pt is scheduled for surgery on 7-15-25.  Questions on stopping medications prior to surgery.   Rx.  Buprofion 150 mg and rx. Paroxetine 20 mg.  Please call pt

## 2025-07-10 NOTE — DISCHARGE INSTRUCTIONS
HOME INSTRUCTIONS    Keep steri strips on, change gauze if saturated as needed, may leave gauze and tegaderm dressing on as long as it is dry and comfortable.  Low fat small meals, avoid dairy for 1-2 weeks  Tylenol and Ibuprofen should be adequate for pain  No lifting > 10 lbs  No driving  See Que Monzon or Bro BLAKE  in approximately 2 wk  May use ice pack on incision prn  Shower starting tomorrow, no submersion in bath/hot tub/pool   Call with any concerns    ____________________________________________________   [facility] HOME CARE INSTRUCTIONS: POST-OP ANESTHESIA  The medication that you received for sedation or general anesthesia can last up to 24 hours. Your judgment and reflexes may be altered, even if you feel like your normal self.      We Recommend:   Do not drive any motor vehicle or bicycle   Avoid mowing the lawn, playing sports, or working with power tools/applicances (power saws, electric knives or mixers)   That you have someone stay with you on your first night home   Do not drink alcohol or take sleeping pills or tranquilizers   Do not sign legal documents within 24 hours of your procedure   If you had a nerve block for your surgery, take extra care not to put any pressure on your arm or hand for 24 hours    It is normal:  For you to have a sore throat if you had a breathing tube during surgery (while you were asleep!). The sore throat should get better within 48 hours. You can gargle with warm salt water (1/2 tsp in 4 oz warm water) or use a throat lozenge for comfort  To feel muscle aches or soreness especially in the abdomen, chest or neck. The achy feeling should go away in the next 24 hours  To feel weak, sleepy or \"wiped out\". Your should start feeling better in the next 24 hours.   To experience mild discomforts such as sore lip or tongue, headache, cramps, gas pains or a bloated feeling in your abdomen.   To experience mild back pain or soreness for a day or two if you had  spinal or epidural anesthesia.   If you had laparoscopic surgery, to feel shoulder pain or discomfort on the day of surgery.   For some patients to have nausea after surgery/anesthesia    If you feel nausea or experience vomiting:   Try to move around less.   Eat less than usual or drink only liquids until the next morning   Nausea should resolve in about 24 hours    If you have a problem when you are at home:    Call your surgeons office   Discharge Instructions: After Your Surgery  You’ve just had surgery. During surgery, you were given medicine called anesthesia to keep you relaxed and free of pain. After surgery, you may have some pain or nausea. This is common. Here are some tips for feeling better and getting well after surgery.   Going home  Your healthcare provider will show you how to take care of yourself when you go home. They'll also answer your questions. Have an adult family member or friend drive you home. For the first 24 hours after your surgery:   Don't drive or use heavy equipment.  Don't make important decisions or sign legal papers.  Take medicines as directed.  Don't drink alcohol.  Have someone stay with you, if needed. They can watch for problems and help keep you safe.  Be sure to go to all follow-up visits with your healthcare provider. And rest after your surgery for as long as your provider tells you to.   Coping with pain  If you have pain after surgery, pain medicine will help you feel better. Take it as directed, before pain becomes severe. Also, ask your healthcare provider or pharmacist about other ways to control pain. This might be with heat, ice, or relaxation. And follow any other instructions your surgeon or nurse gives you.      Stay on schedule with your medicine.     Tips for taking pain medicine  To get the best relief possible, remember these points:   Pain medicines can upset your stomach. Taking them with a little food may help.  Most pain relievers taken by mouth need at  least 20 to 30 minutes to start to work.  Don't wait till your pain becomes severe before you take your medicine. Try to time your medicine so that you can take it before starting an activity. This might be before you get dressed, go for a walk, or sit down for dinner.  Constipation is a common side effect of some pain medicines. Call your healthcare provider before taking any medicines, such as laxatives or stool softeners, to help ease constipation. Also ask if you should skip any foods. Drinking lots of fluids and eating foods, such as fruits and vegetables, that are high in fiber can also help. Remember, don't take laxatives unless your surgeon has prescribed them.  Drinking alcohol and taking pain medicine can cause dizziness and slow your breathing. It can even be deadly. Don't drink alcohol while taking pain medicine.  Pain medicine can make you react more slowly to things. Don't drive or run machinery while taking pain medicine.  Your healthcare provider may tell you to take acetaminophen to help ease your pain. Ask them how much you're supposed to take each day. Acetaminophen or other pain relievers may interact with your prescription medicines or other over-the-counter (OTC) medicines. Some prescription medicines have acetaminophen and other ingredients in them. Using both prescription and OTC acetaminophen for pain can cause you to accidentally overdose. Read the labels on your OTC medicines with care. This will help you to clearly know the list of ingredients, how much to take, and any warnings. It may also help you not take too much acetaminophen. If you have questions or don't understand the information, ask your pharmacist or healthcare provider to explain it to you before you take the OTC medicine.   Managing nausea  Some people have an upset stomach (nausea) after surgery. This is often because of anesthesia, pain, or pain medicine, less movement of food in the stomach, or the stress of surgery.  These tips will help you handle nausea and eat healthy foods as you get better. If you were on a special food plan before surgery, ask your healthcare provider if you should follow it while you get better. Check with your provider on how your eating should progress. It may depend on the surgery you had. These general tips may help:   Don't push yourself to eat. Your body will tell you when to eat and how much.  Start off with clear liquids and soup. They're easier to digest.  Next try semi-solid foods as you feel ready. These include mashed potatoes, applesauce, and gelatin.  Slowly move to solid foods. Don’t eat fatty, rich, or spicy foods at first.  Don't force yourself to have 3 large meals a day. Instead eat smaller amounts more often.  Take pain medicines with a small amount of solid food, such as crackers or toast. This helps prevent nausea.  When to call your healthcare provider  Call your healthcare provider right away if you have any of these:   You still have too much pain, or the pain gets worse, after taking the medicine. The medicine may not be strong enough. Or there may be a complication from the surgery.  You feel too sleepy, dizzy, or groggy. The medicine may be too strong.  Side effects, such as nausea or vomiting. Your healthcare provider may advise taking other medicines to treat these or may change your treatment plan..  Skin changes, such as rash, itching, or hives. This may mean you have an allergic reaction. Your provider may advise taking other medicines.  The incision looks different (for instance, part of it opens up).  Bleeding or fluid leaking from the incision site, and you weren't told to expect that.  Fever of 100.4°F (38°C) or higher, or as directed by your healthcare provider.  Call 911  Call 911 right away if you have:   Trouble breathing  Facial swelling    If you have obstructive sleep apnea   You were given anesthesia medicine during surgery to keep you comfortable and free of  pain. After surgery, you may have more apnea spells because of this medicine and other medicines you were given. The spells may last longer than normal.    At home:  Keep using the continuous positive airway pressure (CPAP) device when you sleep. Unless your healthcare provider tells you not to, use it when you sleep, day or night. CPAP is a common device used to treat obstructive sleep apnea.  Talk with your provider before taking any pain medicine, muscle relaxants, or sedatives. Your provider will tell you about the possible dangers of taking these medicines.  Contact your provider if your sleeping changes a lot even when taking medicines as directed.  [employer] last reviewed this educational content on 4/1/2024  This information is for informational purposes only. This is not intended to be a substitute for professional medical advice, diagnosis, or treatment. Always seek the advice and follow the directions from your physician or other qualified health care provider.  © 8258-1415 [employer]. All rights reserved. This information is not intended as a substitute for professional medical care. Always follow your healthcare professional's instructions.

## 2025-07-15 ENCOUNTER — HOSPITAL ENCOUNTER (OUTPATIENT)
Facility: HOSPITAL | Age: 35
Setting detail: HOSPITAL OUTPATIENT SURGERY
Discharge: HOME | End: 2025-07-15
Attending: SURGERY | Admitting: SURGERY
Payer: COMMERCIAL

## 2025-07-15 ENCOUNTER — ANESTHESIA (OUTPATIENT)
Dept: SURGERY | Facility: HOSPITAL | Age: 35
End: 2025-07-15
Payer: COMMERCIAL

## 2025-07-15 ENCOUNTER — ANESTHESIA EVENT (OUTPATIENT)
Dept: SURGERY | Facility: HOSPITAL | Age: 35
End: 2025-07-15
Payer: COMMERCIAL

## 2025-07-15 VITALS
SYSTOLIC BLOOD PRESSURE: 105 MMHG | OXYGEN SATURATION: 95 % | WEIGHT: 254 LBS | HEART RATE: 95 BPM | TEMPERATURE: 98 F | RESPIRATION RATE: 16 BRPM | DIASTOLIC BLOOD PRESSURE: 86 MMHG | BODY MASS INDEX: 38.49 KG/M2 | HEIGHT: 68 IN

## 2025-07-15 DIAGNOSIS — K80.20 GALLSTONES: ICD-10-CM

## 2025-07-15 DIAGNOSIS — R10.11 RUQ PAIN: ICD-10-CM

## 2025-07-15 DIAGNOSIS — R52 PAIN: Primary | ICD-10-CM

## 2025-07-15 LAB — B-HCG UR QL: NEGATIVE

## 2025-07-15 PROCEDURE — 47562 LAPAROSCOPIC CHOLECYSTECTOMY: CPT | Performed by: SURGERY

## 2025-07-15 RX ORDER — ONDANSETRON 2 MG/ML
4 INJECTION INTRAMUSCULAR; INTRAVENOUS EVERY 6 HOURS PRN
Status: DISCONTINUED | OUTPATIENT
Start: 2025-07-15 | End: 2025-07-15

## 2025-07-15 RX ORDER — HYDROCODONE BITARTRATE AND ACETAMINOPHEN 5; 325 MG/1; MG/1
1 TABLET ORAL ONCE
Refills: 0 | Status: COMPLETED | OUTPATIENT
Start: 2025-07-15 | End: 2025-07-15

## 2025-07-15 RX ORDER — FAMOTIDINE 10 MG/ML
20 INJECTION, SOLUTION INTRAVENOUS ONCE
Status: COMPLETED | OUTPATIENT
Start: 2025-07-15 | End: 2025-07-15

## 2025-07-15 RX ORDER — HYDROMORPHONE HYDROCHLORIDE 1 MG/ML
0.4 INJECTION, SOLUTION INTRAMUSCULAR; INTRAVENOUS; SUBCUTANEOUS EVERY 5 MIN PRN
Status: DISCONTINUED | OUTPATIENT
Start: 2025-07-15 | End: 2025-07-15

## 2025-07-15 RX ORDER — ACETAMINOPHEN 500 MG
1000 TABLET ORAL ONCE
Status: COMPLETED | OUTPATIENT
Start: 2025-07-15 | End: 2025-07-15

## 2025-07-15 RX ORDER — HYDROMORPHONE HYDROCHLORIDE 1 MG/ML
0.6 INJECTION, SOLUTION INTRAMUSCULAR; INTRAVENOUS; SUBCUTANEOUS EVERY 5 MIN PRN
Status: DISCONTINUED | OUTPATIENT
Start: 2025-07-15 | End: 2025-07-15

## 2025-07-15 RX ORDER — BUPIVACAINE HCL/EPINEPHRINE 0.5-1:200K
VIAL (ML) INJECTION AS NEEDED
Status: DISCONTINUED | OUTPATIENT
Start: 2025-07-15 | End: 2025-07-15 | Stop reason: HOSPADM

## 2025-07-15 RX ORDER — HYDROCODONE BITARTRATE AND ACETAMINOPHEN 5; 325 MG/1; MG/1
1 TABLET ORAL EVERY 6 HOURS PRN
Qty: 12 TABLET | Refills: 0 | Status: SHIPPED | OUTPATIENT
Start: 2025-07-15

## 2025-07-15 RX ORDER — NALOXONE HYDROCHLORIDE 0.4 MG/ML
0.08 INJECTION, SOLUTION INTRAMUSCULAR; INTRAVENOUS; SUBCUTANEOUS AS NEEDED
Status: DISCONTINUED | OUTPATIENT
Start: 2025-07-15 | End: 2025-07-15

## 2025-07-15 RX ORDER — LIDOCAINE HYDROCHLORIDE 10 MG/ML
INJECTION, SOLUTION EPIDURAL; INFILTRATION; INTRACAUDAL; PERINEURAL AS NEEDED
Status: DISCONTINUED | OUTPATIENT
Start: 2025-07-15 | End: 2025-07-15 | Stop reason: SURG

## 2025-07-15 RX ORDER — SODIUM CHLORIDE, SODIUM LACTATE, POTASSIUM CHLORIDE, CALCIUM CHLORIDE 600; 310; 30; 20 MG/100ML; MG/100ML; MG/100ML; MG/100ML
INJECTION, SOLUTION INTRAVENOUS CONTINUOUS
Status: DISCONTINUED | OUTPATIENT
Start: 2025-07-15 | End: 2025-07-15

## 2025-07-15 RX ORDER — MIDAZOLAM HYDROCHLORIDE 1 MG/ML
INJECTION INTRAMUSCULAR; INTRAVENOUS AS NEEDED
Status: DISCONTINUED | OUTPATIENT
Start: 2025-07-15 | End: 2025-07-15 | Stop reason: SURG

## 2025-07-15 RX ORDER — PROCHLORPERAZINE EDISYLATE 5 MG/ML
5 INJECTION INTRAMUSCULAR; INTRAVENOUS EVERY 8 HOURS PRN
Status: DISCONTINUED | OUTPATIENT
Start: 2025-07-15 | End: 2025-07-15

## 2025-07-15 RX ORDER — HYDROMORPHONE HYDROCHLORIDE 1 MG/ML
0.2 INJECTION, SOLUTION INTRAMUSCULAR; INTRAVENOUS; SUBCUTANEOUS EVERY 5 MIN PRN
Status: DISCONTINUED | OUTPATIENT
Start: 2025-07-15 | End: 2025-07-15

## 2025-07-15 RX ORDER — IBUPROFEN 600 MG/1
600 TABLET, FILM COATED ORAL EVERY 8 HOURS PRN
Qty: 30 TABLET | Refills: 0 | Status: SHIPPED | OUTPATIENT
Start: 2025-07-15

## 2025-07-15 RX ORDER — FAMOTIDINE 20 MG/1
20 TABLET, FILM COATED ORAL ONCE
Status: COMPLETED | OUTPATIENT
Start: 2025-07-15 | End: 2025-07-15

## 2025-07-15 RX ORDER — ROCURONIUM BROMIDE 10 MG/ML
INJECTION, SOLUTION INTRAVENOUS AS NEEDED
Status: DISCONTINUED | OUTPATIENT
Start: 2025-07-15 | End: 2025-07-15 | Stop reason: SURG

## 2025-07-15 RX ADMIN — LIDOCAINE HYDROCHLORIDE 50 MG: 10 INJECTION, SOLUTION EPIDURAL; INFILTRATION; INTRACAUDAL; PERINEURAL at 10:57:00

## 2025-07-15 RX ADMIN — SODIUM CHLORIDE, SODIUM LACTATE, POTASSIUM CHLORIDE, CALCIUM CHLORIDE: 600; 310; 30; 20 INJECTION, SOLUTION INTRAVENOUS at 11:47:00

## 2025-07-15 RX ADMIN — MIDAZOLAM HYDROCHLORIDE 2 MG: 1 INJECTION INTRAMUSCULAR; INTRAVENOUS at 10:50:00

## 2025-07-15 RX ADMIN — ROCURONIUM BROMIDE 20 MG: 10 INJECTION, SOLUTION INTRAVENOUS at 11:20:00

## 2025-07-15 RX ADMIN — SODIUM CHLORIDE, SODIUM LACTATE, POTASSIUM CHLORIDE, CALCIUM CHLORIDE: 600; 310; 30; 20 INJECTION, SOLUTION INTRAVENOUS at 10:50:00

## 2025-07-15 RX ADMIN — ROCURONIUM BROMIDE 40 MG: 10 INJECTION, SOLUTION INTRAVENOUS at 11:00:00

## 2025-07-15 NOTE — ANESTHESIA POSTPROCEDURE EVALUATION
Patient: Gely Erwin    Procedure Summary       Date: 07/15/25 Room / Location: [facility] MAIN OR 02 / EM MAIN OR    Anesthesia Start: 1050 Anesthesia Stop:     Procedure: Laparoscopic cholecystectomy Diagnosis:       RUQ pain      Gallstones      (RUQ pain [R10.11]Gallstones [K80.20])    Surgeons: Suzie Bermudez MD Anesthesiologist: Tobias Santana DO    Anesthesia Type: general ASA Status: 2            Anesthesia Type: general    Vitals Value Taken Time   /61 07/15/25 12:16   Temp 98.3 07/15/25 12:16   Pulse 101 07/15/25 12:16   Resp 18 07/15/25 12:16   SpO2 94 % 07/15/25 12:16   Vitals shown include unfiled device data.    [facility] AN Post Evaluation:   Patient Evaluated in PACU  Patient Participation: complete - patient participated  Level of Consciousness: awake and alert  Pain Score: 0  Pain Management: adequate  Airway Patency:patent  Dental exam unchanged from preop  Yes    Nausea/Vomiting: none  Cardiovascular Status: acceptable  Respiratory Status: acceptable  Postoperative Hydration acceptable      RULA LIVINGSTON CRNA  7/15/2025 12:16 PM

## 2025-07-15 NOTE — INTERVAL H&P NOTE
Pre-op Diagnosis: RUQ pain [R10.11]  Gallstones [K80.20]    The above referenced H&P was reviewed by Suzie Bermudez MD on 7/15/2025, the patient was examined and no significant changes have occurred in the patient's condition since the H&P was performed.  I discussed with the patient and/or legal representative the potential benefits, risks and side effects of this procedure; the likelihood of the patient achieving goals; and potential problems that might occur during recuperation.  I discussed reasonable alternatives to the procedure, including risks, benefits and side effects related to the alternatives and risks related to not receiving this procedure.  We will proceed with procedure as planned.

## 2025-07-15 NOTE — H&P
Chief complaint: RUQ pain, gallstones    HPI: Gely  presents for consult for gallstones and worsening abd pain, RUQ, radiates around costal margin. The pain is daily, randomly occurring and often postprandial. No h/o J/F/C. No h/o pancreatitis. No h/o PUD. She has no recent imaging, an US has been ordered. We reviewed her US done at [facility] in 2023, gallstones without cholecystitis. She had been scheduled for lap paul with Dr. Leiva, but then she got pregnant and surgery was postponed.     Past medical history: Past Medical History[1]    Past surgical history: Past Surgical History[2]    Allergies: Allergies[3]    Medications: Current Medications[4]    Social history:   Set as collapsible by default.[5]     Family history:  Family History[6]     Review of Systems:   GENERAL: feels generally well  SKIN: no ulcerated or worrisome skin lesions  EYES:denies blurred vision or double vision  HEENT: denies new nasal congestion, sinus pain or ST  LUNGS: denies shortness of breath with exertion  CARDIOVASCULAR: denies chest pain on exertion  GI: no hematemesis, no BRBPR, no worsening heartburn  : no dysuria, no blood in urine, no difficulty urinating  MUSCULOSKELETAL: no new musculoskeletal complaints  NEURO: no persistent, recurrent  headaches  PSYCHE:no depression or anxiety  HEMATOLOGIC: no hx of blood dyscrasia  ENDOCRINE: no new endocrine problems  ALL/ASTHMA: no new hx of severe allergy or asthma  BACK: normal, no spinal deformity, no CVA tenderness    Physical examination:     Constitutional: appears well hydrated alert and responsive no acute distress noted  HEENT wnl, anicteric, PERRL, normocephalic, atraumatic  Neck supple, norm ROM, no JVD  L CTA B  H Reg rate  Abd soft, NT, ND, no masses, no hernias, no HSM.  Extr no c/c/e  Skin intact, no jaundice, no rashes, no lesions  Neuro grossly intact, no focal deficits, no tremors  Back no deformity, no CVA tnd.         Assessment and plan:  There are no diagnoses  linked to this encounter.       Plan lap paul, possible open, possible cholangiogram  We have discussed the surgical risks, benefits, alternatives, and expected recovery. All of the patient's questions have been answered to her satisfaction.  Low fat small meals, good hydration discussed. S//s of concern discussed as well.     Thank you     Suzie Bermudez MD                 [1]   Past Medical History:   Anxiety state    Essential hypertension    Generalized anxiety disorder    High cholesterol    History of psychological trauma    Hyperglycemia    Postpartum anxiety (HCC)    Postpartum depression    Visual impairment   [2]   Past Surgical History:  Procedure Laterality Date    Ankle fracture surgery Right 1999    Surgical repair of Broken ankle-Right (as per NG)   [3]   Allergies  Allergen Reactions    Lamotrigine HIVES and ITCHING   [4]   Current Outpatient Medications   Medication Sig Dispense Refill    buPROPion  MG Oral Tablet 24 Hr TAKE 1 TABLET (150 MG TOTAL) BY MOUTH IN THE MORNING 30 tablet 0    PARoxetine (PAXIL) 20 MG Oral Tab Take 1 tablet (20 mg total) by mouth at bedtime. 90 tablet 0    LORazepam 0.5 MG Oral Tab Take 0.5-1 tablets (0.25-0.5 mg total) by mouth 2 (two) times daily as needed for Anxiety (panic). (Patient taking differently: Take 2-4 tablets (1-2 mg total) by mouth as needed in the morning and 2-4 tablets (1-2 mg total) as needed in the evening for Anxiety (panic).) 90 tablet 0    cefadroxil 500 MG Oral Cap Take 1 capsule (500 mg total) by mouth 2 (two) times daily. (Patient not taking: Reported on 6/26/2025) 30 capsule 0    ketoconazole 2 % External Cream Apply 1 Application topically 2 (two) times daily. (Patient not taking: Reported on 5/10/2025) 30 g 1    Magnesium 250 MG Oral Cap Take 500 mg by mouth 2 (two) times daily. (Patient not taking: Reported on 5/10/2025)      prenatal vitamin with DHA 27-0.8-228 MG Oral Cap Take 1 capsule by mouth daily. (Patient not taking: Reported  on 5/10/2025)     [5]   Social History  Socioeconomic History    Marital status:    Tobacco Use    Smoking status: Never    Smokeless tobacco: Never   Vaping Use    Vaping status: Never Used   Substance and Sexual Activity    Alcohol use: Not Currently     Comment: Socially, 1 beer, last month (as per NG)    Drug use: No   [6]   Family History  Problem Relation Age of Onset    Lipids Father     Personality Disorder Mother     Breast Cancer Mother         In 2021    Glaucoma Mother     Diabetes Maternal Grandmother     High Blood Pressure Maternal Grandmother     Lipids Maternal Grandmother     Heart Attack Maternal Grandmother 50        (as per NG)    Breast Cancer Maternal Grandmother     Macular degeneration Paternal Grandmother     Allergies Brother         (as per NG)    Cancer Other         MGM sister

## 2025-07-15 NOTE — ANESTHESIA PREPROCEDURE EVALUATION
Anesthesia PreOp Note    HPI:     Gely Erwin is a 35 year old female who presents for preoperative consultation requested by: Suzie Bermudez MD    Date of Surgery: 7/15/2025    Procedure(s):  Laparoscopic cholecystectomy, possible open cholecystectomy, possible intraoperative cholangiogram  Indication: RUQ pain [R10.11]  Gallstones [K80.20]    Relevant Problems   No relevant active problems       NPO:  Last Liquid Consumption Date: 07/14/25     Last Solid Consumption Date: 07/14/25     Last Liquid Consumption Date: 07/14/25          History Review:  Patient Active Problem List    Diagnosis Date Noted    At high risk for breast cancer 01/15/2025    History of psychological trauma     Generalized anxiety disorder     Postpartum anxiety (HCC)     Postpartum depression     Anxiety 04/01/2024    Vitamin D deficiency 08/18/2022       Past Medical History[1]    Past Surgical History[2]    Prescriptions Prior to Admission[3]  Current Medications and Prescriptions Ordered in Epic[4]    Allergies[5]    Family History[6]  Social Hx on file[7]    Available pre-op labs reviewed.             Vital Signs:  Body mass index is 38.62 kg/m².   height is 1.727 m (5' 8\") and weight is 115.2 kg (254 lb). Her oral temperature is 98.2 °F (36.8 °C). Her blood pressure is 122/70 and her pulse is 81. Her respiration is 16 and oxygen saturation is 98%.   Vitals:    07/10/25 1056 07/15/25 1024   BP:  122/70   Pulse:  81   Resp:  16   Temp:  98.2 °F (36.8 °C)   TempSrc:  Oral   SpO2:  98%   Weight: 113.4 kg (250 lb) 115.2 kg (254 lb)   Height: 1.727 m (5' 8\")         Anesthesia Evaluation      Airway   Mallampati: III  TM distance: >3 FB  Neck ROM: full  Dental      Pulmonary - normal exam   (-) asthma  Cardiovascular - normal exam  (-) hypertension    Neuro/Psych    (+)  anxiety/panic attacks,        GI/Hepatic/Renal    (-) GERD    Endo/Other    (-) diabetes mellitus  Abdominal   (+) obese                 Anesthesia Plan:    ASA:  2  Plan:   General  Airway:  ETT  Plan Comments: RSI  Informed Consent Plan and Risks Discussed With:  Patient  Consent Comment: I have discussed the anesthetic plan, major risks and alternatives with the patient and answered all questions.  Minor and major side effects were discussed with patient, including but not limited to: injury to teeth/gums/lips, aspiration, nausea/vomiting postoperatively, anaphylaxis, heart attack, stroke, post-operative ventilation and death. The patient desires to proceed with surgery and anesthesia as planned. All questions answered.    Discussed plan with:  CRNA      I have informed Gely Erwin and/or legal guardian or family member of the nature of the anesthetic plan, benefits, risks including possible dental damage if relevant, major complications, and any alternative forms of anesthetic management.   All of the patient's questions were answered to the best of my ability. The patient desires the anesthetic management as planned.  Tobias Santana,   7/15/2025 10:36 AM  Present on Admission:  **None**           [1]   Past Medical History:   Anxiety state    Essential hypertension    Generalized anxiety disorder    High cholesterol    History of psychological trauma    Hyperglycemia    Postpartum anxiety (HCC)    Postpartum depression    Visual impairment   [2]   Past Surgical History:  Procedure Laterality Date    Ankle fracture surgery Right 1999    Surgical repair of Broken ankle-Right (as per NG)   [3]   Medications Prior to Admission   Medication Sig Dispense Refill Last Dose/Taking    buPROPion  MG Oral Tablet 24 Hr TAKE 1 TABLET (150 MG TOTAL) BY MOUTH IN THE MORNING 30 tablet 0 7/14/2025 at  3:00 PM    PARoxetine (PAXIL) 20 MG Oral Tab Take 1 tablet (20 mg total) by mouth at bedtime. 90 tablet 0 7/15/2025 at  4:00 AM    LORazepam 0.5 MG Oral Tab Take 0.5-1 tablets (0.25-0.5 mg total) by mouth 2 (two) times daily as needed for Anxiety (panic). (Patient  taking differently: Take 2-4 tablets (1-2 mg total) by mouth as needed in the morning and 2-4 tablets (1-2 mg total) as needed in the evening for Anxiety (panic).) 90 tablet 0 7/15/2025 at  9:30 AM    cefadroxil 500 MG Oral Cap Take 1 capsule (500 mg total) by mouth 2 (two) times daily. (Patient not taking: No sig reported) 30 capsule 0 Not Taking    ketoconazole 2 % External Cream Apply 1 Application topically 2 (two) times daily. (Patient not taking: Reported on 3/18/2025) 30 g 1 Not Taking    Magnesium 250 MG Oral Cap Take 500 mg by mouth 2 (two) times daily. (Patient not taking: Reported on 1/15/2025)   Not Taking    prenatal vitamin with DHA 27-0.8-228 MG Oral Cap Take 1 capsule by mouth daily. (Patient not taking: Reported on 3/18/2025)   More than a month   [4]   Current Facility-Administered Medications Ordered in Epic   Medication Dose Route Frequency Provider Last Rate Last Admin    lactated ringers infusion   Intravenous Continuous Suzie Bermudez MD        acetaminophen (Tylenol Extra Strength) tab 1,000 mg  1,000 mg Oral Once Suzie Bermudez MD        famotidine (Pepcid) tab 20 mg  20 mg Oral Once Suzie Bermudez MD        Or    famotidine (Pepcid) 20 mg/2mL injection 20 mg  20 mg Intravenous Once Suzie Bermudez MD        ceFAZolin (Ancef) 2g in 10mL IV syringe premix  2 g Intravenous Once Suzie Bermudez MD         No current [facility]-ordered outpatient medications on file.   [5]   Allergies  Allergen Reactions    Lamotrigine HIVES and ITCHING   [6]   Family History  Problem Relation Age of Onset    Lipids Father     Personality Disorder Mother     Breast Cancer Mother         In 2021    Glaucoma Mother     Diabetes Maternal Grandmother     High Blood Pressure Maternal Grandmother     Lipids Maternal Grandmother     Heart Attack Maternal Grandmother 50        (as per NG)    Breast Cancer Maternal Grandmother     Macular degeneration Paternal Grandmother     Allergies Brother         (as per  NG)    Cancer Other         MGM sister   [7]   Social History  Socioeconomic History    Marital status:    Tobacco Use    Smoking status: Never    Smokeless tobacco: Never   Vaping Use    Vaping status: Never Used   Substance and Sexual Activity    Alcohol use: Not Currently     Comment: Socially, 1 beer, last month (as per NG)    Drug use: No   Other Topics Concern    Caffeine Concern Yes     Comment: Coffee, Unknown amount (as per NG)    Reaction to local anesthetic No

## 2025-07-15 NOTE — OPERATIVE REPORT
Patient Name:  Gely Erwin  MR:  [id number]  :  1990  DOS:  07/15/25    Preop Dx:  RUQ pain [R10.11]  Gallstones [K80.20]  Postop Dx:  RUQ pain [R10.11]Gallstones [K80.20]  Procedure:  Laparoscopic cholecystectomy  Surgeon:  Suzie Bermudez MD  Surgical Assistant.: Karen Carvalho CSA  EBL:20 mL  Complication:  None    INDICATION:  Pt is a 35 year old female who has ongoing  RUQ pain [R10.11]  Gallstones [K80.20] who is scheduled for a Laparoscopic cholecystectomy, possible open cholecystectomy, possible intraoperative cholangiogram.    CONSENT:  An informed consent discussion was held with the patient regarding the nature of RUQ pain [R10.11]  Gallstones [K80.20], the treatment options and the details of the procedure.  The risks including but not limited to bleeding, wound infection, intra-abdominal infection, injury to the liver, colon, small intestine, pancreas, stomach, common bile duct, incomplete resection, cystic duct stump leak, retained stone and incisional hernia were discussed.  The patient expressed understanding and want to proceed with the planned procedure.    TECHNIQUE:  The patient was taken to the OR and placed in supine position.  General anesthesia was established and the abdomen was prepped in standard fashion.  Pneumoperitoneum was obtained using open technique through a supra-umbilical incision.  A 12-mm trocar was inserted under direct visualization and no injury occurred.  Examination of the abdomen showed a thickened and inflamed appearing gallbladder consistent with RUQ pain [R10.11]  Gallstones [K80.20].  Two 5-mm trocars were placed in the epigastric and right abdomen.  The patient was placed in reverse Trendelenburg position.  The gallbladder was grasped and retracted cephalad and to the right.  The peritoneum along the medial and lateral aspect of the gallbladder/liver edge were freed with the [place] dissector, small lymphatics were divided using the  hook cautery.  The lower 1/3 of the gallbladder was dissected from the liver.  Two structures, identified as the cystic duct and artery, were visualized circumferentially with the 30 degree lens and noted to be entering the infundibulum, thus obtaining a critical view of safety.  The cystic duct and artery were clipped and divided.  The gallbladder was detached from the liver using hook cautery and delivered from the abdomen using an endocatch bag.  The abdominal cavity was irrigated with saline and found to be hemostatic.  The trocars were removed under direct visualization and no port site bleeding was seen.  The supra-umbilical fascia was closed using 0 vicryl.  The skin incisions were closed using 4-0 vicryl.  Sterile dressings were applied.  All instrument and sponge counts were correct.  I was present during the entire procedure and performed the operation.

## 2025-07-15 NOTE — ANESTHESIA PROCEDURE NOTES
Airway  Date/Time: 7/15/2025 10:59 AM  Reason: Elective      General Information and Staff   Patient location during procedure: OR  Anesthesiologist: Tobias Santana DO  Performed: anesthesiologist   Performed by: Tobias Santana DO  Authorized by: Tobias Santana DO        Indications and Patient Condition  Indications for airway management: anesthesia  Sedation level: deep      Preoxygenated: yesPatient position: sniffing    Mask difficulty assessment: 1 - vent by mask    Final Airway Details    Final airway type: endotracheal airway    Successful airway: ETT  Cuffed: yes   Successful intubation technique: direct laryngoscopy  Endotracheal tube insertion site: oral  Blade: Toni  Blade size: #4  ETT size (mm): 7.5    Cormack-Lehane Classification: grade I - full view of glottis  Placement verified by: capnometry   Measured from: lips  ETT to lips (cm): 21  Number of attempts at approach: 1

## 2025-07-30 ENCOUNTER — OFFICE VISIT (OUTPATIENT)
Dept: SURGERY | Facility: CLINIC | Age: 35
End: 2025-07-30
Payer: COMMERCIAL

## 2025-07-30 VITALS
DIASTOLIC BLOOD PRESSURE: 77 MMHG | BODY MASS INDEX: 39 KG/M2 | WEIGHT: 254 LBS | SYSTOLIC BLOOD PRESSURE: 124 MMHG | HEART RATE: 82 BPM

## 2025-07-30 DIAGNOSIS — Z48.89 ENCOUNTER FOR POSTOPERATIVE CARE: Primary | ICD-10-CM

## 2025-07-30 PROCEDURE — 99024 POSTOP FOLLOW-UP VISIT: CPT

## 2025-07-30 RX ORDER — ONDANSETRON 4 MG/1
4 TABLET, FILM COATED ORAL EVERY 8 HOURS PRN
Qty: 20 TABLET | Refills: 0 | Status: SHIPPED | OUTPATIENT
Start: 2025-07-30

## (undated) DEVICE — SOLUTION IRRIG 1000ML 0.9% NACL USP BTL

## (undated) DEVICE — GLOVE SUR 6.5 SENSICARE PI PIP GRN PWD F

## (undated) DEVICE — TROCARS: Brand: KII® BALLOON BLUNT TIP SYSTEM

## (undated) DEVICE — CORE REPOSABLE TRUMPET FOR SINGLE SOLUTION BAG: Brand: CORE DYNAMICS

## (undated) DEVICE — GAMMEX® PI HYBRID SIZE 6.5, STERILE POWDER-FREE SURGICAL GLOVE, POLYISOPRENE AND NEOPRENE BLEND: Brand: GAMMEX

## (undated) DEVICE — TUBING MEGADYNE LAPAROSCOPIC

## (undated) DEVICE — EVACUATOR SUR 100CC SIL BLB WND

## (undated) DEVICE — DRAIN SUR W10MMXL20CM SIL FULL PERF HUBLESS

## (undated) DEVICE — LAP CHOLE: Brand: MEDLINE INDUSTRIES, INC.

## (undated) DEVICE — MARYLAND JAW LAPAROSCOPIC SEALER/DIVIDER COATED: Brand: LIGASURE

## (undated) DEVICE — SOLUTION IRRIG 3000ML 0.9% NACL FLX CONT

## (undated) DEVICE — SUT COAT VCRL+ 0 27IN UR-6 ABSRB VLT ANTIBACT

## (undated) DEVICE — TROCAR: Brand: KII® SLEEVE

## (undated) DEVICE — ABSORBABLE HEMOSTAT (OXIDIZED REGENERATED CELLULOSE): Brand: SURGICEL

## (undated) DEVICE — TISSUE RETRIEVAL SYSTEM: Brand: INZII RETRIEVAL SYSTEM

## (undated) DEVICE — PLUMEPORT ACTIV LAPAROSCOPIC SMOKE FILTRATION DEVICE: Brand: PLUMEPORT ACTIVE

## (undated) DEVICE — [HIGH FLOW INSUFFLATOR,  DO NOT USE IF PACKAGE IS DAMAGED,  KEEP DRY,  KEEP AWAY FROM SUNLIGHT,  PROTECT FROM HEAT AND RADIOACTIVE SOURCES.]: Brand: PNEUMOSURE

## (undated) DEVICE — CLIP APPLIER WITH CLIP LOGIC TECHNOLOGY: Brand: ENDO CLIP III

## (undated) DEVICE — TROCAR: Brand: KII FIOS FIRST ENTRY

## (undated) DEVICE — EXOFIN PRECISION PEN HIGH VISCOSITY TOPICAL SKIN ADHESIVE: Brand: EXOFIN PRECISION PEN, 1G

## (undated) DEVICE — GLOVE SUR 6.5 SENSICARE PI MIC PIP CRM PWD F

## (undated) DEVICE — UNDYED BRAIDED (POLYGLACTIN 910), SYNTHETIC ABSORBABLE SUTURE: Brand: COATED VICRYL

## (undated) NOTE — LETTER
Date & Time: 2/26/2024, 8:05 PM  Patient: Gely ADDISON  Encounter Provider(s):    Beto Darby MD       To Whom It May Concern:    Gely ADDISON was seen and treated in our department on 2/26/2024.     If you have any questions or concerns, please do not hesitate to call.        _____________________________  Physician/APC Signature

## (undated) NOTE — LETTER
VACCINE ADMINISTRATION RECORD  PARENT / GUARDIAN APPROVAL  Date: 2024  Vaccine administered to: Gely Erwin     : 1990    MRN: [id number]    A copy of the appropriate Centers for Disease Control and Prevention Vaccine Information statement has been provided. I have read or have had explained the information about the diseases and the vaccines listed below. There was an opportunity to ask questions and any questions were answered satisfactorily. I believe that I understand the benefits and risks of the vaccine cited and ask that the vaccine(s) listed below be given to me or to the person named above (for whom I am authorized to make this request).    VACCINES ADMINISTERED:  Tdap    I have read and hereby agree to be bound by the terms of this agreement as stated above. My signature is valid until revoked by me in writing.  This document is signed by Gely SAWYER Coltonana , relationship: Self on 2024.:                                                                                                                                       Parent / Guardian Signature                                                Date    Claudia CARMICHAEL RN served as a witness to authentication that the identity of the person signing electronically is in fact the person represented as signing.

## (undated) NOTE — MR AVS SNAPSHOT
[address] [facility] [address]  [phone]               Thank you for choosing us for your health care visit with Angie Metzger MD.  We are glad to serve you and happy to provide you with this summary of your visit. [address], [address] [facility]     Phone:  [phone]    - Cyclobenzaprine HCl 5 MG Tabs            MyChart     Visit MyChart  You can access your MyChart to more actively manage your health care increments are effective and add up over the week   2 ½ hours per week – spread out over time Use a colin to keep you motivated   Don’t forget strength training with weights and resistance Set goals and track your progress   You don’t need to join a gym.

## (undated) NOTE — LETTER
Date & Time: 2/26/2024, 8:57 PM  Patient: Gely ADDISON  Encounter Provider(s):    Beto Darby MD       To Whom It May Concern:    Gely ADDISON was seen and treated in our department on 2/26/2024. She should not return to work until March 2nd, 2024 .    If you have any questions or concerns, please do not hesitate to call.        _____________________________  Physician/APC Signature

## (undated) NOTE — LETTER
Dear New MomRocío, we missed you! The nurses of [facility] have tried to reach you by phone to ask if you have any questions regarding your health or the health and care of your new little one.    We hope you are doing well. If, for any reason, you have questions or concerns about your health or your baby’s health, please contact your provider or your pediatrician or family medicine physician regarding your baby.     At [facility], we feel that postpartum support is very important for new families. Please see the enclosed new parent support flyer that lists support programs and resources with both in-person and online options.     Additionally, our Breastfeeding Centers at [facility] and [facility] in [place], offer outpatient visits with our International Board-Certified Lactation   Consultants (IBCLCs) for any breastfeeding concerns or questions you may have.    For issues related to stress, anxiety or depression, we have a Nurturing Mom support group that meets both in-person or online.  There’s also a 24-hour Mom’s Line where you can request a phone call from a clinical therapist for assistance for postpartum depression.    We encourage you to take advantage of these programs and resources as you recover from childbirth and learn to care for your new infant.    Best wishes,    [facility]            [phone]

## (undated) NOTE — LETTER
6/13/2024              Gely Erwin        [address] [facility] [address]         To whom it may concern,      Gely Erwin is under our care for pregnancy.   She may return to work effective June 17, 2024.          Sincerely,        Jennifer Bowens MD          Document electronically generated by:  Jennifer Bowens MD